# Patient Record
Sex: MALE | Race: WHITE | Employment: OTHER | URBAN - METROPOLITAN AREA
[De-identification: names, ages, dates, MRNs, and addresses within clinical notes are randomized per-mention and may not be internally consistent; named-entity substitution may affect disease eponyms.]

---

## 2020-02-18 ENCOUNTER — HOSPITAL ENCOUNTER (OUTPATIENT)
Dept: LAB | Age: 70
Discharge: HOME | End: 2020-02-18

## 2020-02-18 ENCOUNTER — OFFICE VISIT (OUTPATIENT)
Dept: ONCOLOGY | Age: 70
End: 2020-02-18

## 2020-02-18 VITALS
SYSTOLIC BLOOD PRESSURE: 117 MMHG | TEMPERATURE: 97.4 F | WEIGHT: 231 LBS | OXYGEN SATURATION: 96 % | BODY MASS INDEX: 37.12 KG/M2 | HEIGHT: 66 IN | DIASTOLIC BLOOD PRESSURE: 81 MMHG | RESPIRATION RATE: 20 BRPM | HEART RATE: 85 BPM

## 2020-02-18 DIAGNOSIS — L98.9 SKIN LESION OF BACK: ICD-10-CM

## 2020-02-18 DIAGNOSIS — L98.6 ATYPICAL LYMPHOCYTIC INFILTRATE OF SKIN: Primary | ICD-10-CM

## 2020-02-18 DIAGNOSIS — C85.80 MARGINAL ZONE LYMPHOMA (HCC): ICD-10-CM

## 2020-02-18 DIAGNOSIS — L98.6 ATYPICAL LYMPHOCYTIC INFILTRATE OF SKIN: ICD-10-CM

## 2020-02-18 LAB
ALBUMIN SERPL-MCNC: 4.1 G/DL (ref 3.5–5)
ALBUMIN/GLOB SERPL: 1.3 {RATIO} (ref 1.1–2.2)
ALP SERPL-CCNC: 98 U/L (ref 45–117)
ALT SERPL-CCNC: 43 U/L (ref 12–78)
ANION GAP SERPL CALC-SCNC: 6 MMOL/L (ref 5–15)
AST SERPL-CCNC: 24 U/L (ref 15–37)
BASOPHILS # BLD: 0 K/UL (ref 0–0.1)
BASOPHILS NFR BLD: 1 % (ref 0–1)
BILIRUB SERPL-MCNC: 0.5 MG/DL (ref 0.2–1)
BUN SERPL-MCNC: 15 MG/DL (ref 6–20)
BUN/CREAT SERPL: 17 (ref 12–20)
CALCIUM SERPL-MCNC: 9.2 MG/DL (ref 8.5–10.1)
CHLORIDE SERPL-SCNC: 107 MMOL/L (ref 97–108)
CO2 SERPL-SCNC: 27 MMOL/L (ref 21–32)
CREAT SERPL-MCNC: 0.89 MG/DL (ref 0.7–1.3)
DIFFERENTIAL METHOD BLD: NORMAL
EOSINOPHIL # BLD: 0.3 K/UL (ref 0–0.4)
EOSINOPHIL NFR BLD: 5 % (ref 0–7)
ERYTHROCYTE [DISTWIDTH] IN BLOOD BY AUTOMATED COUNT: 13.5 % (ref 11.5–14.5)
GLOBULIN SER CALC-MCNC: 3.1 G/DL (ref 2–4)
GLUCOSE SERPL-MCNC: 93 MG/DL (ref 65–100)
HCT VFR BLD AUTO: 47.7 % (ref 36.6–50.3)
HGB BLD-MCNC: 15.4 G/DL (ref 12.1–17)
IMM GRANULOCYTES # BLD AUTO: 0 K/UL (ref 0–0.04)
IMM GRANULOCYTES NFR BLD AUTO: 0 % (ref 0–0.5)
LDH SERPL L TO P-CCNC: 179 U/L (ref 85–241)
LYMPHOCYTES # BLD: 1.5 K/UL (ref 0.8–3.5)
LYMPHOCYTES NFR BLD: 28 % (ref 12–49)
MCH RBC QN AUTO: 30.3 PG (ref 26–34)
MCHC RBC AUTO-ENTMCNC: 32.3 G/DL (ref 30–36.5)
MCV RBC AUTO: 93.9 FL (ref 80–99)
MONOCYTES # BLD: 0.7 K/UL (ref 0–1)
MONOCYTES NFR BLD: 13 % (ref 5–13)
NEUTS SEG # BLD: 2.8 K/UL (ref 1.8–8)
NEUTS SEG NFR BLD: 53 % (ref 32–75)
NRBC # BLD: 0 K/UL (ref 0–0.01)
NRBC BLD-RTO: 0 PER 100 WBC
PLATELET # BLD AUTO: 199 K/UL (ref 150–400)
PMV BLD AUTO: 10.1 FL (ref 8.9–12.9)
POTASSIUM SERPL-SCNC: 4.2 MMOL/L (ref 3.5–5.1)
PROT SERPL-MCNC: 7.2 G/DL (ref 6.4–8.2)
RBC # BLD AUTO: 5.08 M/UL (ref 4.1–5.7)
SODIUM SERPL-SCNC: 140 MMOL/L (ref 136–145)
WBC # BLD AUTO: 5.4 K/UL (ref 4.1–11.1)

## 2020-02-18 RX ORDER — CETIRIZINE HCL 10 MG
TABLET ORAL
COMMUNITY

## 2020-02-18 RX ORDER — ASPIRIN 81 MG/1
TABLET ORAL DAILY
COMMUNITY

## 2020-02-18 RX ORDER — NIACIN 500 MG/1
500 TABLET, EXTENDED RELEASE ORAL DAILY
COMMUNITY
Start: 2020-02-01 | End: 2022-06-10

## 2020-02-18 RX ORDER — ATORVASTATIN CALCIUM 20 MG/1
20 TABLET, FILM COATED ORAL DAILY
COMMUNITY
Start: 2020-01-22

## 2020-02-18 RX ORDER — ACETAMINOPHEN 500 MG
TABLET ORAL
COMMUNITY

## 2020-02-18 RX ORDER — BETAMETHASONE DIPROPIONATE 0.5 MG/G
LOTION TOPICAL AS NEEDED
COMMUNITY

## 2020-02-18 NOTE — PROGRESS NOTES
[address] [facility], [place] [facility] at [address] [place] [address]  [phone]    Hematology / Oncology Consult    Reason for Visit:   Austen Estes is a 81 y.o. male who is seen in consultation at the request of DAVID Tinsley and Dr. Ashley Villavicencio ([facility]) for evaluation of possible skin lymphoma. Hematology Oncology Treatment History:     Diagnosis: Possible marginal zone lymphoma    Stage: Pending    Pathology:   1/16/20 R lateral mid-back, 5mm shave biopsy: Atypical lymphoid infiltrate  Comment: The histologic features are quite worrisome for a low-grade B cell neoplasm, possibly a marginal zone lymphoma given the presence of quite a few plasma cells wtihin the infiltrate. However, in situ hybridization for kappa and lambda fialed to produce a monoclonal result and PCR for T- and B-cell rearrangements failed to produce a pcr product and clonality could not be demonstrated. The tissue in the block has been completely exhausted and is no longer available for further studies. It would be important that complete removal or additional biopsies of this process be performed such that repeat studies can be attempted in the hopes of making a more definitive diagnosis. Prior Treatment: None    Current Treatment: pending  Treatment duration   Frequency of visits     History of Present Illness:   Austen Estes is a 81 y.o. male with h/o skin cancer comes in for evaluation of atypical lymphoid infiltrate seen on skin biopsy. He had a Mohs surgery approx 1 yr ago for melanoma. He has been following closely with Dermatology. He recently underwent shave biopsies of 2 lesions on his R back. Pathology showed a atypical lymphoid infiltrate in one of biopsies. The other biopsy was negative. He states that one of these regions has been present for years, but bothering him more recently with pain and irritation.  He had a previous biopsy years ago per patient and based on that history, Dermatology had been treating those lesions as a possible keloid. His chronic medical problems include prostate cancer s/p prostatectomy in 2005, now with yearly PSA checks with PCP. He also has hypercholesterolemia for which he takes statin. No fevers, chills, sweats, unintentional weight loss, n/v/d. He notes dry skin which cracks at times, but no recurrent or current rashes. Past Medical History:   Diagnosis Date    Chronic pain     Hypercholesterolemia       Past Surgical History:   Procedure Laterality Date    HX CHOLECYSTECTOMY      HX HEENT      HX PROSTATE SURGERY      HX VASECTOMY        Social History     Tobacco Use    Smoking status: Never Smoker    Smokeless tobacco: Never Used   Substance Use Topics    Alcohol use: Yes     Alcohol/week: 2.0 standard drinks     Types: 2 Glasses of wine per week      Family History   Problem Relation Age of Onset    Hypertension Mother     Stroke Mother     Cancer Father         multiple myeloma     Current Outpatient Medications   Medication Sig    atorvastatin (LIPITOR) 20 mg tablet Take 20 mg by mouth daily.  niacin ER (NIASPAN) 500 mg tablet Take 500 mg by mouth daily.  betamethasone dipropionate (DIPROLENE) 0.05 % topical lotion Apply  to affected area as needed for Skin Irritation.  folic acid/multivit-min/lutein (CENTRUM SILVER PO) Take  by mouth daily.  aspirin delayed-release 81 mg tablet Take  by mouth daily.  cetirizine (ZYRTEC) 10 mg tablet Take  by mouth daily as needed for Allergies.  acetaminophen (TYLENOL EXTRA STRENGTH) 500 mg tablet Take  by mouth every six (6) hours as needed for Pain.  fluticasone furoate (FLONASE SENSIMIST NA) 2 Sprays by Nasal route daily as needed. No current facility-administered medications for this visit.        Allergies   Allergen Reactions    Ibuprofen Swelling     Swelling and rash to hands & fingers          Review of Systems: A complete review of systems was obtained, negative except as described above.    Physical Exam:     Visit Vitals  /81   Pulse 85   Temp 97.4 °F (36.3 °C) (Oral)   Resp 20   Ht 5' 6\" (1.676 m)   Wt 231 lb (104.8 kg)   SpO2 96%   BMI 37.28 kg/m²     ECOG PS: 0  General: Well developed, no acute distress  Eyes: PERRLA, EOMI, anicteric sclerae  HENT: Atraumatic, OP clear, TMs intact without erythema  Neck: Supple  Lymphatic: No cervical, supraclavicular, axillary or inguinal adenopathy  Respiratory: CTAB, normal respiratory effort  CV: Normal rate, regular rhythm, no murmurs, no peripheral edema  GI: Soft, nontender, nondistended, no masses, no hepatomegaly, no splenomegaly  MS: Normal gait and station. Digits without clubbing or cyanosis. Skin: No ecchymoses, or petechiae. Normal temperature, turgor, and texture. R upper back with erythematous raised lesions - streaks along R mid back and more discrete purplish red raised papular lesions, 1 is 2.5cm horizontally; two subcentimeter similar lesions more laterally. Neuro/Psych: Alert, oriented. 5/5 strength in all 4 extremities. Appropriate affect, normal judgment/insight. Results:   No results found for: WBC, HGB, HCT, PLT, MCV, ANEU, HGBPOC, HCTPOC, HGBEXT, HCTEXT, PLTEXT  No results found for: NA, K, CL, CO2, GLU, BUN, CREA, GFRAA, GFRNA, CA, NAPOC, KPOCT, CLPOC, GLUCPOC, IBUN, CREAPOC, ICAI  No results found for: TBILI, ALT, SGOT, AP, TP, ALB, GLOB  No results found for: IRON, FE, TIBC, IBCT, PSAT, FERR    No results found for: B12LT, FOL, RBCF  No results found for: TSH, TSH2, TSH3, TSHP, TSHEXT  No results found for: HAMAT, HAAB, HABT, HAAT, HBSAG, HBSB, HBSAT, HBABN, HBCM, HBCAB, HBCAT, XBCABS, HBEAB, HBEAG, XHEPCS, [id number], [address] [facility], [facility], [facility], [facility], [address], [id number], [id number], [address] [place] [address], [id number], [facility], [id number], HCGAT      Imaging:     Radiology report(s) reviewed. .    Assessment & Plan:   Freida Faust is a 81 y.o. male with atypical lymphoid infiltrate, possible lymphoma, in skin lesion.     1. Atypical lymphoid infiltrate of skin / Possible marginal zone lymphoma: Pathology testing did not demonstrate monoclonality or B or T-cell gene rearrangements. However, these lesions do appear suspicious and I recommend taking a larger biopsy to enable further and repeat testing.  -- CBC, CMP, LDH, gammopathy panel  -- CT of chest/abd/pelvis  -- BM is not routinely performed  -- Repeat biopsy with a larger, deeper sample is recommended of the same 2.5cm vertical raised lesion. I also recommend taking a biopsy of 1 of 2 similar raised lesions immediately lateral to this larger lesion. [facility] who may discuss with a surgeon for the repeat biopsy. -- Return in 3 weeks for f/u    2. Hyperlipidemia: Managed by PCP and on statin. 3. H/o prostate cancer: s/p prostatectomy in 2005. Emotional well being: Pt is coping well with his/her disease and has excellent support. I appreciate the opportunity to participate in Mr. Jayshree phillips.     Signed By: Phylliss Spurling, MD     February 18, 2020

## 2020-02-20 ENCOUNTER — TELEPHONE (OUTPATIENT)
Dept: ONCOLOGY | Age: 70
End: 2020-02-20

## 2020-02-20 DIAGNOSIS — C44.599 OTHER SPECIFIED MALIGNANT NEOPLASM OF SKIN OF OTHER PART OF TRUNK: ICD-10-CM

## 2020-02-20 DIAGNOSIS — L98.6 ATYPICAL LYMPHOCYTIC INFILTRATE OF SKIN: Primary | ICD-10-CM

## 2020-02-20 DIAGNOSIS — L98.9 SKIN LESION OF BACK: ICD-10-CM

## 2020-02-20 NOTE — TELEPHONE ENCOUNTER
Phuong from scheduling   They need a new CT Scan order of the Abd and pelvis with Contrast    Other order said with and without and they send the scheduling of this to a different hospital

## 2020-02-21 LAB
ALBUMIN SERPL ELPH-MCNC: 4 G/DL (ref 2.9–4.4)
ALBUMIN/GLOB SERPL: 1.4 {RATIO} (ref 0.7–1.7)
ALPHA1 GLOB SERPL ELPH-MCNC: 0.2 G/DL (ref 0–0.4)
ALPHA2 GLOB SERPL ELPH-MCNC: 0.8 G/DL (ref 0.4–1)
B-GLOBULIN SERPL ELPH-MCNC: 0.9 G/DL (ref 0.7–1.3)
GAMMA GLOB SERPL ELPH-MCNC: 1 G/DL (ref 0.4–1.8)
GLOBULIN SER-MCNC: 2.9 G/DL (ref 2.2–3.9)
IGA SERPL-MCNC: 114 MG/DL (ref 61–437)
IGG SERPL-MCNC: 1049 MG/DL (ref 700–1600)
IGM SERPL-MCNC: 81 MG/DL (ref 20–172)
INTERPRETATION SERPL IEP-IMP: NORMAL
KAPPA LC FREE SER-MCNC: 14.9 MG/L (ref 3.3–19.4)
KAPPA LC FREE/LAMBDA FREE SER: 1.13 {RATIO} (ref 0.26–1.65)
LAMBDA LC FREE SERPL-MCNC: 13.2 MG/L (ref 5.7–26.3)
M PROTEIN SERPL ELPH-MCNC: NORMAL G/DL
PROT SERPL-MCNC: 6.9 G/DL (ref 6–8.5)

## 2020-02-27 ENCOUNTER — HOSPITAL ENCOUNTER (OUTPATIENT)
Dept: CT IMAGING | Age: 70
Discharge: HOME | End: 2020-02-27
Attending: INTERNAL MEDICINE
Payer: MEDICARE

## 2020-02-27 DIAGNOSIS — L98.9 SKIN LESION OF BACK: ICD-10-CM

## 2020-02-27 DIAGNOSIS — C44.599 OTHER SPECIFIED MALIGNANT NEOPLASM OF SKIN OF OTHER PART OF TRUNK: ICD-10-CM

## 2020-02-27 DIAGNOSIS — L98.6 ATYPICAL LYMPHOCYTIC INFILTRATE OF SKIN: ICD-10-CM

## 2020-02-27 PROCEDURE — 74011636320 HC RX REV CODE- 636/320: Performed by: INTERNAL MEDICINE

## 2020-02-27 PROCEDURE — 74177 CT ABD & PELVIS W/CONTRAST: CPT

## 2020-02-27 RX ADMIN — IOPAMIDOL 100 ML: 755 INJECTION, SOLUTION INTRAVENOUS at 14:53

## 2020-03-11 NOTE — PROGRESS NOTES
[id number] [facility] at [facility]  [phone]    Hematology / Oncology Established Visit    Reason for Visit:   Austen Estes is a 81 y.o. male who comes in for f/u of cutaneous lymphoma. Initially referred by DAVID Tinsley and Dr. Ashley Villavicencio ([address] Hematology Oncology Treatment History:     Diagnosis: Primary cutaneous marginal zone lymphoma    Stage: Pending    Pathology:   1/16/20 R lateral mid-back, 5mm shave biopsy: Atypical lymphoid infiltrate  Comment: The histologic features are quite worrisome for a low-grade B cell neoplasm, possibly a marginal zone lymphoma given the presence of quite a few plasma cells wtihin the infiltrate. However, in situ hybridization for kappa and lambda fialed to produce a monoclonal result and PCR for T- and B-cell rearrangements failed to produce a pcr product and clonality could not be demonstrated. The tissue in the block has been completely exhausted and is no longer available for further studies. It would be important that complete removal or additional biopsies of this process be performed such that repeat studies can be attempted in the hopes of making a more definitive diagnosis. 2/20/20 R mid-back lateral lesion and excision right medial mid-back skin lesion: Marginal zone lymphoma  Immunophenotype: BCL2+, CD43+; Tumor size up to 17mm; Grade 1  Flow cytometry: Monoclonal B cell population (18% of cells) without detectable CD5, CD10, CD23 expression c/w B cell lymphoma/leukemia. Differential diagnosis baesd on immunophenotype includes: Marginal zone lymphoma, lymphoplasmacytic lymphoma, [id number] follicular lymphoma, large B cell lymphoma. Prior Treatment: None    Current Treatment: Pt to get evaluated by [facility].    Treatment duration   Frequency of visits     History of Present Illness:   Austen Estes is a 81 y.o. male with h/o skin cancer comes in for evaluation of atypical lymphoid infiltrate seen on skin biopsy. He had a Mohs surgery approx 1 yr ago for melanoma. He has been following closely with Dermatology. He recently underwent shave biopsies of 2 lesions on his R back. Pathology showed a atypical lymphoid infiltrate in one of biopsies. The other biopsy was negative. He states that one of these regions has been present for years, but bothering him more recently with pain and irritation. He had a previous biopsy years ago per patient and based on that history, Dermatology had been treating those lesions as a possible keloid. His chronic medical problems include prostate cancer s/p prostatectomy in 2005, now with yearly PSA checks with PCP. He also has hypercholesterolemia for which he takes statin. No fevers, chills, sweats, unintentional weight loss, n/v/d. He notes dry skin which cracks at times, but no recurrent or current rashes. Patient comes in for follow up after recent surgical resection of the raised lesions on right lateral back by Dr. Lavern Mcpherson. Past Medical History:   Diagnosis Date    Chronic pain     Hypercholesterolemia       Past Surgical History:   Procedure Laterality Date    HX CHOLECYSTECTOMY      HX HEENT      HX PROSTATE SURGERY      HX VASECTOMY        Social History     Tobacco Use    Smoking status: Never Smoker    Smokeless tobacco: Never Used   Substance Use Topics    Alcohol use: Yes     Alcohol/week: 2.0 standard drinks     Types: 2 Glasses of wine per week      Family History   Problem Relation Age of Onset    Hypertension Mother     Stroke Mother     Cancer Father         multiple myeloma     Current Outpatient Medications   Medication Sig    atorvastatin (LIPITOR) 20 mg tablet Take 20 mg by mouth daily.  niacin ER (NIASPAN) 500 mg tablet Take 500 mg by mouth daily.  betamethasone dipropionate (DIPROLENE) 0.05 % topical lotion Apply  to affected area as needed for Skin Irritation.     folic acid/multivit-min/lutein (CENTRUM SILVER PO) Take  by mouth daily.    aspirin delayed-release 81 mg tablet Take  by mouth daily.  cetirizine (ZYRTEC) 10 mg tablet Take  by mouth daily as needed for Allergies.  acetaminophen (TYLENOL EXTRA STRENGTH) 500 mg tablet Take  by mouth every six (6) hours as needed for Pain.  fluticasone furoate (FLONASE SENSIMIST NA) 2 Sprays by Nasal route daily as needed. No current facility-administered medications for this visit. Allergies   Allergen Reactions    Ibuprofen Swelling     Swelling and rash to hands & fingers          Review of Systems: A complete review of systems was obtained, negative except as described above. Physical Exam:     There were no vitals taken for this visit. ECOG PS: 0  General: Well developed, no acute distress  Eyes: PERRLA, EOMI, anicteric sclerae  HENT: Atraumatic, OP clear, TMs intact without erythema  Neck: Supple  Lymphatic: No cervical, supraclavicular, axillary or inguinal adenopathy  Respiratory: CTAB, normal respiratory effort  CV: Normal rate, regular rhythm, no murmurs, no peripheral edema  GI: Soft, nontender, nondistended, no masses, no hepatomegaly, no splenomegaly  MS: Normal gait and station. Digits without clubbing or cyanosis. Skin: No ecchymoses, or petechiae. Normal temperature, turgor, and texture. R upper back with erythematous raised lesions - streaks along R mid back and more discrete purplish red raised papular lesions, 1 is 2.5cm horizontally; two subcentimeter similar lesions more laterally. Neuro/Psych: Alert, oriented. 5/5 strength in all 4 extremities. Appropriate affect, normal judgment/insight. Results:     Lab Results   Component Value Date/Time    WBC 5.4 02/18/2020 03:23 PM    HGB 15.4 02/18/2020 03:23 PM    HCT 47.7 02/18/2020 03:23 PM    PLATELET 979 66/62/1762 03:23 PM    MCV 93.9 02/18/2020 03:23 PM    ABS.  NEUTROPHILS 2.8 02/18/2020 03:23 PM     Lab Results   Component Value Date/Time    Sodium 140 02/18/2020 03:23 PM    Potassium 4.2 2020 03:23 PM    Chloride 107 2020 03:23 PM    CO2 27 2020 03:23 PM    Glucose 93 2020 03:23 PM    BUN 15 2020 03:23 PM    Creatinine 0.89 2020 03:23 PM    GFR est AA >60 2020 03:23 PM    GFR est non-AA >60 2020 03:23 PM    Calcium 9.2 2020 03:23 PM     Lab Results   Component Value Date/Time    Bilirubin, total 0.5 2020 03:23 PM    ALT (SGPT) 43 2020 03:23 PM    AST (SGOT) 24 2020 03:23 PM    Alk. phosphatase 98 2020 03:23 PM    Protein, total 7.2 2020 03:23 PM    Protein, total 6.9 2020 03:23 PM    Albumin 4.1 2020 03:23 PM    Globulin 3.1 2020 03:23 PM     No results found for: IRON, FE, TIBC, IBCT, PSAT, FERR    No results found for: B12LT, FOL, RBCF  No results found for: TSH, TSH2, TSH3, TSHP, TSHEXT, TSHEXT  No results found for: HAMAT, HAAB, HABT, HAAT, HBSAG, HBSB, HBSAT, HBABN, HBCM, HBCAB, HBCAT, XBCABS, [facility], [address] [place] [address], [address] [place] [address], 606[address]706 [facility], [address] [place] [address], [address] [place] [address], [id number], [id number], [address] [place] [address], [id number], HBCMLT, [id number], HCGAT      Imagin/27/20 CT of ch/abd/p:  IMPRESSION:  No evidence of lymphadenopathy in the chest, abdomen, or pelvis. Assessment & Plan:   Yolis Bello is a 81 y.o. male with atypical lymphoid infiltrate, possible lymphoma, in skin lesion. 1. Primary cutaneous marginal zone lymphoma: No B symptoms, lymphadenopathy, cytopenias. SPEP and free light chains negative for monoclonal protein. BM is not routinely performed for lymphoma limited to the skin. If these skin lesions can be contained within one radiation field, the recommended treatment is local radiation therapy rather than observation, surgery or chemoimmunotherapy. A radiation dose of 24 Gy is typically used. For those with asymptomatic, but multifocal disease, it is recommended to follow observation.  If any of the lesions become symptomatic, treatment is directed at the symptomatic lesions with intralesional triamcinolone, low-dose radiation therapy, or surgical excision rather than chemotherapy - given indolent nature. -- Refer to [facility] for radiation to skin lesions. -- Return in 3 months for f/u    2. Hyperlipidemia: Managed by PCP and on statin. 3. H/o prostate cancer: s/p prostatectomy in 2005. Emotional well being: Pt is coping well with his/her disease and has excellent support. I appreciate the opportunity to participate in Mr. Skyler Pablo care.     Signed By: Seun Astorga MD     March 12, 2020

## 2020-03-12 ENCOUNTER — OFFICE VISIT (OUTPATIENT)
Dept: ONCOLOGY | Age: 70
End: 2020-03-12

## 2020-03-12 VITALS
OXYGEN SATURATION: 98 % | WEIGHT: 230 LBS | SYSTOLIC BLOOD PRESSURE: 126 MMHG | DIASTOLIC BLOOD PRESSURE: 78 MMHG | HEIGHT: 66 IN | RESPIRATION RATE: 16 BRPM | BODY MASS INDEX: 36.96 KG/M2 | HEART RATE: 76 BPM

## 2020-03-12 DIAGNOSIS — C88.4 PRIMARY CUTANEOUS MARGINAL ZONE B-CELL LYMPHOMA (HCC): Primary | ICD-10-CM

## 2020-03-12 DIAGNOSIS — Z85.46 PERSONAL HISTORY OF PROSTATE CANCER: ICD-10-CM

## 2020-03-12 DIAGNOSIS — L98.6 ATYPICAL LYMPHOCYTIC INFILTRATE OF SKIN: ICD-10-CM

## 2020-03-12 NOTE — PROGRESS NOTES
Roxanna Bella is a 81 y.o. male here today for follow up of lymphoma. Complains of bilateral hip pain and right knee pain, 2/10.

## 2020-03-13 ENCOUNTER — TELEPHONE (OUTPATIENT)
Dept: ONCOLOGY | Age: 70
End: 2020-03-13

## 2020-03-13 NOTE — TELEPHONE ENCOUNTER
Call placed to patient. Verified ID x 2. Advised of scheduled appointment with Dr. Abner Lin on 3/19/20, to arrive at 10:15 am.  Patient states he may have scheduling conflict that day but will check calendar. Provided contact number to Dr. Blake Alex office should he need to reschedule.

## 2020-04-08 ENCOUNTER — HOSPITAL ENCOUNTER (OUTPATIENT)
Dept: RADIATION THERAPY | Age: 70
Discharge: HOME | End: 2020-04-08

## 2020-06-02 NOTE — PROGRESS NOTES
[address] [facility]  [phone]    Hematology / Oncology Established Visit    Reason for Visit:   Felipa Mcgrath is a 81 y.o. male who is seen by synchronous (real-time) audio-video technology on 6/4/2020 for follow up of  cutaneous lymphoma. Initially referred by DAVID Wolfe and Dr. Demetri Garcia ([address] [facility])    Hematology Oncology Treatment History:     Diagnosis: Primary cutaneous marginal zone lymphoma    Stage: Pending    Pathology:   1/16/20 R lateral mid-back, 5mm shave biopsy: Atypical lymphoid infiltrate  Comment: The histologic features are quite worrisome for a low-grade B cell neoplasm, possibly a marginal zone lymphoma given the presence of quite a few plasma cells wtihin the infiltrate. However, in situ hybridization for kappa and lambda fialed to produce a monoclonal result and PCR for T- and B-cell rearrangements failed to produce a pcr product and clonality could not be demonstrated. The tissue in the block has been completely exhausted and is no longer available for further studies. It would be important that complete removal or additional biopsies of this process be performed such that repeat studies can be attempted in the hopes of making a more definitive diagnosis. 2/20/20 R mid-back lateral lesion and excision right medial mid-back skin lesion: Marginal zone lymphoma  Immunophenotype: BCL2+, CD43+; Tumor size up to 17mm; Grade 1  Flow cytometry: Monoclonal B cell population (18% of cells) without detectable CD5, CD10, CD23 expression c/w B cell lymphoma/leukemia. Differential diagnosis baesd on immunophenotype includes: Marginal zone lymphoma, lymphoplasmacytic lymphoma, [id number] follicular lymphoma, large B cell lymphoma.     Prior Treatment: Radiation 200cGy x 12 fractions, 4/30/20 - 5/15/20    Current Treatment: Surveillance    History of Present Illness:   Felipa Mcgrath is a 81 y.o. male with h/o skin cancer is seen for evaluation of cutaneous marginal zone lymphoma. He had a Mohs surgery in 2019 for melanoma. He has been following closely with Dermatology. He recently underwent shave biopsies of 2 lesions on his R back. Pathology showed a atypical lymphoid infiltrate in one of biopsies. The other biopsy was negative. He states that one of these regions has been present for years, but bothering him more recently with pain and irritation. He had a previous biopsy years ago per patient and based on that history, Dermatology had been treating those lesions as a possible keloid. His chronic medical problems include prostate cancer s/p prostatectomy in 2005, now with yearly PSA checks with PCP. He also has hypercholesterolemia for which he takes statin. No fevers, chills, sweats, unintentional weight loss, n/v/d. He notes dry skin which cracks at times, but no recurrent or current rashes. Patient underwent surgical resection of the raised lesions on right lateral back by Dr. Nichole Ramos. Interval History:  Pt is seen for follow up of cutaneous marginal zone lymphoma. He recently completed radiation to the skin lesions on his back on 5/15/20. Aside from mild erythema, no significant rash. No new skin lesions. No medication changes. No recent infections or illnesses. No fevers, chills, sweats, lumps/bumps, unintentional weight loss. Past Medical History:   Diagnosis Date    Chronic pain     Hypercholesterolemia       Past Surgical History:   Procedure Laterality Date    HX CHOLECYSTECTOMY      HX HEENT      HX PROSTATE SURGERY      HX VASECTOMY        Social History     Tobacco Use    Smoking status: Never Smoker    Smokeless tobacco: Never Used   Substance Use Topics    Alcohol use:  Yes     Alcohol/week: 2.0 standard drinks     Types: 2 Glasses of wine per week      Family History   Problem Relation Age of Onset    Hypertension Mother     Stroke Mother     Cancer Father         multiple myeloma     Current Outpatient Medications   Medication Sig    atorvastatin (LIPITOR) 20 mg tablet Take 20 mg by mouth daily.  niacin ER (NIASPAN) 500 mg tablet Take 500 mg by mouth daily.  betamethasone dipropionate (DIPROLENE) 0.05 % topical lotion Apply  to affected area as needed for Skin Irritation.  folic acid/multivit-min/lutein (CENTRUM SILVER PO) Take  by mouth daily.  aspirin delayed-release 81 mg tablet Take  by mouth daily.  cetirizine (ZYRTEC) 10 mg tablet Take  by mouth daily as needed for Allergies.  acetaminophen (TYLENOL EXTRA STRENGTH) 500 mg tablet Take  by mouth every six (6) hours as needed for Pain.  fluticasone furoate (FLONASE SENSIMIST NA) 2 Sprays by Nasal route daily as needed. No current facility-administered medications for this visit. Allergies   Allergen Reactions    Ibuprofen Swelling     Swelling and rash to hands & fingers          Review of Systems: A complete review of systems was obtained, negative except as described above. Physical Exam:     Due to this being a TeleHealth evaluation, many elements of the physical examination are unable to be assessed. Constitutional: Appears well-developed and well-nourished in no apparent distress   Mental status: Alert and awake, Oriented to person/place/time, Able to follow commands  Eyes: EOM normal, Sclera normal, No visible ocular discharge  HENT: Normocephalic, atraumatic; Mouth/Throat: Moist mucous membranes, External Ears normal  Neck: No visualized mass  Pulmonary/Chest: Respiratory effort normal, No visualized signs of difficulty breathing or respiratory distress   Musculoskeletal: Normal gait with no signs of ataxia, Normal range of motion of neck  Neurological: No facial asymmetry (Cranial nerve 7 motor function), No gaze palsy  Skin: No significant exanthematous lesions or discoloration noted on facial skin  Psychiatric: Normal affect, normal judgment/insight.  No hallucinations     Results:     Lab Results Component Value Date/Time    WBC 5.4 2020 03:23 PM    HGB 15.4 2020 03:23 PM    HCT 47.7 2020 03:23 PM    PLATELET 234  03:23 PM    MCV 93.9 2020 03:23 PM    ABS. NEUTROPHILS 2.8 2020 03:23 PM     Lab Results   Component Value Date/Time    Sodium 140 2020 03:23 PM    Potassium 4.2 2020 03:23 PM    Chloride 107 2020 03:23 PM    CO2 27 2020 03:23 PM    Glucose 93 2020 03:23 PM    BUN 15 2020 03:23 PM    Creatinine 0.89 2020 03:23 PM    GFR est AA >60 2020 03:23 PM    GFR est non-AA >60 2020 03:23 PM    Calcium 9.2 2020 03:23 PM     Lab Results   Component Value Date/Time    Bilirubin, total 0.5 2020 03:23 PM    ALT (SGPT) 43 2020 03:23 PM    Alk. phosphatase 98 2020 03:23 PM    Protein, total 7.2 2020 03:23 PM    Protein, total 6.9 2020 03:23 PM    Albumin 4.1 2020 03:23 PM    Globulin 3.1 2020 03:23 PM     No results found for: IRON, FE, TIBC, IBCT, PSAT, FERR    No results found for: B12LT, FOL, RBCF  No results found for: TSH, TSH2, TSH3, TSHP, TSHEXT, TSHEXT  No results found for: HAMAT, HAAB, HABT, HAAT, HBSAG, HBSB, HBSAT, HBABN, HBCM, HBCAB, HBCAT, XBCABS, [address], [address], [address] [facility], [address] [facility], [address] [facility], [address] [place] [address], [address] [place] [address], [id number], [id number], [address] [place] [address], [id number], HBCMLT, [id number], HCGAT      Imagin/27/20 CT of ch/abd/p:  IMPRESSION:  No evidence of lymphadenopathy in the chest, abdomen, or pelvis. Assessment & Plan:   Pamela Fry is a 81 y.o. male with atypical lymphoid infiltrate, possible lymphoma, in skin lesion. 1. Primary cutaneous marginal zone lymphoma: No B symptoms, lymphadenopathy, cytopenias. SPEP and free light chains negative for monoclonal protein. BM is not routinely performed for lymphoma limited to the skin.  If these skin lesions can be contained within one radiation field, the recommended treatment is local radiation therapy rather than observation, surgery or chemoimmunotherapy. A radiation dose of 24 Gy is typically used. For those with asymptomatic, but multifocal disease, it is recommended to follow observation. If any of the lesions become symptomatic, treatment is directed at the symptomatic lesions with intralesional triamcinolone, low-dose radiation therapy, or surgical excision rather than chemotherapy - given indolent nature. Now s/p radiation to skin lesions on his upper back 5/15/20. -- Return in 6 months for f/u  -- Sees Dermatology every 6 months, next visit scheduled 7/16/20.    2. Hyperlipidemia: Managed by PCP and on statin. 3. H/o prostate cancer: s/p prostatectomy in 2005. Emotional well being: Pt is coping well with his/her disease and has excellent support. I appreciate the opportunity to participate in Mr. Jackson Seek care. Total physician time spent on this encounter was 40 minutes. I was in the office while conducting this encounter. The patient was at his at home    Consent:  He and/or his healthcare decision maker is aware that this patient-initiated Telehealth encounter is a billable service, with coverage as determined by his insurance carrier. He is aware that he may receive a bill and has provided verbal consent to proceed: Yes    Pursuant to the emergency declaration under the [address] and the [facility], [address] waiver authority and the [employer] Act, this Virtual  Visit was conducted, with patient's (and/or legal guardian's) consent, to reduce the patient's risk of exposure to COVID-19 and provide necessary medical care. Services were provided through a video synchronous discussion virtually to substitute for in-person visit.       Signed By: Ramila Mott MD     June 4, 2020

## 2020-06-04 ENCOUNTER — VIRTUAL VISIT (OUTPATIENT)
Dept: ONCOLOGY | Age: 70
End: 2020-06-04

## 2020-06-04 DIAGNOSIS — L98.6 ATYPICAL LYMPHOCYTIC INFILTRATE OF SKIN: ICD-10-CM

## 2020-06-04 DIAGNOSIS — Z85.46 PERSONAL HISTORY OF PROSTATE CANCER: ICD-10-CM

## 2020-06-04 DIAGNOSIS — L98.9 SKIN LESION OF BACK: ICD-10-CM

## 2020-06-04 DIAGNOSIS — C88.4 PRIMARY CUTANEOUS MARGINAL ZONE B-CELL LYMPHOMA (HCC): Primary | ICD-10-CM

## 2020-06-04 NOTE — PROGRESS NOTES
Autumn Solis is a 81 y.o. male here for follow up of lymphoma. Patient with no complaints of pain at this time.

## 2020-08-07 ENCOUNTER — HOSPITAL ENCOUNTER (OUTPATIENT)
Dept: RADIATION THERAPY | Age: 70
Discharge: HOME | End: 2020-08-07

## 2020-11-30 ENCOUNTER — HOSPITAL ENCOUNTER (OUTPATIENT)
Dept: LAB | Age: 70
Discharge: HOME | End: 2020-11-30
Payer: MEDICARE

## 2020-11-30 PROCEDURE — 83615 LACTATE (LD) (LDH) ENZYME: CPT

## 2020-11-30 PROCEDURE — 80053 COMPREHEN METABOLIC PANEL: CPT

## 2020-11-30 PROCEDURE — 85025 COMPLETE CBC W/AUTO DIFF WBC: CPT

## 2020-11-30 PROCEDURE — 36415 COLL VENOUS BLD VENIPUNCTURE: CPT

## 2020-12-01 DIAGNOSIS — C88.4 PRIMARY CUTANEOUS MARGINAL ZONE B-CELL LYMPHOMA (HCC): ICD-10-CM

## 2020-12-01 LAB
ALBUMIN SERPL-MCNC: 4.6 G/DL (ref 3.8–4.8)
ALBUMIN/GLOB SERPL: 2.1 {RATIO} (ref 1.2–2.2)
ALP SERPL-CCNC: 101 IU/L (ref 39–117)
ALT SERPL-CCNC: 25 IU/L (ref 0–44)
AST SERPL-CCNC: 22 IU/L (ref 0–40)
BASOPHILS # BLD AUTO: 0 X10E3/UL (ref 0–0.2)
BASOPHILS NFR BLD AUTO: 1 %
BILIRUB SERPL-MCNC: 0.7 MG/DL (ref 0–1.2)
BUN SERPL-MCNC: 14 MG/DL (ref 8–27)
BUN/CREAT SERPL: 15 (ref 10–24)
CALCIUM SERPL-MCNC: 9.5 MG/DL (ref 8.6–10.2)
CHLORIDE SERPL-SCNC: 105 MMOL/L (ref 96–106)
CO2 SERPL-SCNC: 24 MMOL/L (ref 20–29)
CREAT SERPL-MCNC: 0.94 MG/DL (ref 0.76–1.27)
EOSINOPHIL # BLD AUTO: 0.3 X10E3/UL (ref 0–0.4)
EOSINOPHIL NFR BLD AUTO: 6 %
ERYTHROCYTE [DISTWIDTH] IN BLOOD BY AUTOMATED COUNT: 12.6 % (ref 11.6–15.4)
GLOBULIN SER CALC-MCNC: 2.2 G/DL (ref 1.5–4.5)
GLUCOSE SERPL-MCNC: 104 MG/DL (ref 65–99)
HCT VFR BLD AUTO: 47.2 % (ref 37.5–51)
HGB BLD-MCNC: 15.9 G/DL (ref 13–17.7)
IMM GRANULOCYTES # BLD AUTO: 0 X10E3/UL (ref 0–0.1)
IMM GRANULOCYTES NFR BLD AUTO: 0 %
LDH SERPL-CCNC: 159 IU/L (ref 121–224)
LYMPHOCYTES # BLD AUTO: 1.6 X10E3/UL (ref 0.7–3.1)
LYMPHOCYTES NFR BLD AUTO: 32 %
MCH RBC QN AUTO: 30.6 PG (ref 26.6–33)
MCHC RBC AUTO-ENTMCNC: 33.7 G/DL (ref 31.5–35.7)
MCV RBC AUTO: 91 FL (ref 79–97)
MONOCYTES # BLD AUTO: 0.7 X10E3/UL (ref 0.1–0.9)
MONOCYTES NFR BLD AUTO: 13 %
NEUTROPHILS # BLD AUTO: 2.5 X10E3/UL (ref 1.4–7)
NEUTROPHILS NFR BLD AUTO: 48 %
PLATELET # BLD AUTO: 208 X10E3/UL (ref 150–450)
POTASSIUM SERPL-SCNC: 4.3 MMOL/L (ref 3.5–5.2)
PROT SERPL-MCNC: 6.8 G/DL (ref 6–8.5)
RBC # BLD AUTO: 5.19 X10E6/UL (ref 4.14–5.8)
SODIUM SERPL-SCNC: 142 MMOL/L (ref 134–144)
WBC # BLD AUTO: 5.2 X10E3/UL (ref 3.4–10.8)

## 2020-12-02 NOTE — PROGRESS NOTES
[address] [facility]  [phone]    Hematology / Oncology Established Visit    Reason for Visit:   Mitchell Kumar is a 78 y.o. male who is seen for follow up of  cutaneous lymphoma. Initially referred by DAVID Bird and Dr. Anita Castaneda ([address] [facility])    Hematology Oncology Treatment History:     Diagnosis: Primary cutaneous marginal zone lymphoma    Stage: N/A    Pathology:   1/16/20 R lateral mid-back, 5mm shave biopsy: Atypical lymphoid infiltrate  Comment: The histologic features are quite worrisome for a low-grade B cell neoplasm, possibly a marginal zone lymphoma given the presence of quite a few plasma cells wtihin the infiltrate. However, in situ hybridization for kappa and lambda fialed to produce a monoclonal result and PCR for T- and B-cell rearrangements failed to produce a pcr product and clonality could not be demonstrated. The tissue in the block has been completely exhausted and is no longer available for further studies. It would be important that complete removal or additional biopsies of this process be performed such that repeat studies can be attempted in the hopes of making a more definitive diagnosis. 2/20/20 R mid-back lateral lesion and excision right medial mid-back skin lesion: Marginal zone lymphoma  Immunophenotype: BCL2+, CD43+; Tumor size up to 17mm; Grade 1  Flow cytometry: Monoclonal B cell population (18% of cells) without detectable CD5, CD10, CD23 expression c/w B cell lymphoma/leukemia. Differential diagnosis baesd on immunophenotype includes: Marginal zone lymphoma, lymphoplasmacytic lymphoma, [id number] follicular lymphoma, large B cell lymphoma. Prior Treatment: Radiation 200cGy x 12 fractions, 4/30/20 - 5/15/20    Current Treatment: Surveillance    History of Present Illness:   Mitchell Kumar is a 78 y.o. male with h/o skin cancer is seen for evaluation of cutaneous marginal zone lymphoma.  He had a Mohs surgery in 2019 for melanoma. He has been following closely with Dermatology. He recently underwent shave biopsies of 2 lesions on his R back. Pathology showed a atypical lymphoid infiltrate in one of biopsies. The other biopsy was negative. He states that one of these regions has been present for years, but bothering him more recently with pain and irritation. He had a previous biopsy years ago per patient and based on that history, Dermatology had been treating those lesions as a possible keloid. His chronic medical problems include prostate cancer s/p prostatectomy in 2005, now with yearly PSA checks with PCP. He also has hypercholesterolemia for which he takes statin. No fevers, chills, sweats, unintentional weight loss, n/v/d. He notes dry skin which cracks at times, but no recurrent or current rashes. Patient underwent surgical resection of the raised lesions on right lateral back by Dr. Minda Dominguez. Interval History:  Pt is seen for follow up of cutaneous marginal zone lymphoma. He completed radiation to the skin lesions on his back on 5/15/20. Intermittently he notices tingling right lower back near radiation site. Denies erythema or rashes. No new skin lesions. No medication changes. No recent infections or illnesses. No fevers, chills, sweats, lumps/bumps, unintentional weight loss. Past Medical History:   Diagnosis Date    Chronic pain     Hypercholesterolemia       Past Surgical History:   Procedure Laterality Date    HX CHOLECYSTECTOMY      HX HEENT      HX PROSTATE SURGERY      HX VASECTOMY        Social History     Tobacco Use    Smoking status: Never Smoker    Smokeless tobacco: Never Used   Substance Use Topics    Alcohol use:  Yes     Alcohol/week: 2.0 standard drinks     Types: 2 Glasses of wine per week      Family History   Problem Relation Age of Onset    Hypertension Mother     Stroke Mother     Cancer Father         multiple myeloma     Current Outpatient Medications Medication Sig    atorvastatin (LIPITOR) 20 mg tablet Take 20 mg by mouth daily.  niacin ER (NIASPAN) 500 mg tablet Take 500 mg by mouth daily.  betamethasone dipropionate (DIPROLENE) 0.05 % topical lotion Apply  to affected area as needed for Skin Irritation.  folic acid/multivit-min/lutein (CENTRUM SILVER PO) Take  by mouth daily.  aspirin delayed-release 81 mg tablet Take  by mouth daily.  cetirizine (ZYRTEC) 10 mg tablet Take  by mouth daily as needed for Allergies.  acetaminophen (TYLENOL EXTRA STRENGTH) 500 mg tablet Take  by mouth every six (6) hours as needed for Pain.  fluticasone furoate (FLONASE SENSIMIST NA) 2 Sprays by Nasal route daily as needed. No current facility-administered medications for this visit. Allergies   Allergen Reactions    Ibuprofen Swelling     Swelling and rash to hands & fingers          Review of Systems: A complete review of systems was obtained, negative except as described above. Physical Exam:       Visit Vitals  /81   Pulse 88   Temp (!) 96.6 °F (35.9 °C)   Resp 16   Ht 5' 6\" (1.676 m)   Wt 228 lb (103.4 kg)   SpO2 96%   BMI 36.80 kg/m²       ECOG PS: 0  General: Well developed, no acute distress  Eyes: PERRLA, EOMI, anicteric sclerae  HENT: Atraumatic, OP clear, TMs intact without erythema  Neck: Supple  Lymphatic: No cervical, supraclavicular, axillary or inguinal adenopathy  Respiratory: CTAB, normal respiratory effort  CV: Normal rate, regular rhythm, no murmurs, no peripheral edema  GI: Soft, nontender, nondistended, no masses, no hepatomegaly, no splenomegaly  MS: Normal gait and station. Digits without clubbing or cyanosis. Skin: No rashes, ecchymoses, or petechiae. Normal temperature, turgor, and texture. Midline 4cm vertical surgical scar and approx 10cm right lateral torso surgical scar. Neuro/Psych: Alert, oriented. 5/5 strength in all 4 extremities. Appropriate affect, normal judgment/insight.       Results:     Lab Results   Component Value Date/Time    WBC 5.2 2020 08:46 AM    HGB 15.9 2020 08:46 AM    HCT 47.2 2020 08:46 AM    PLATELET 531  08:46 AM    MCV 91 2020 08:46 AM    ABS. NEUTROPHILS 2.5 2020 08:46 AM     Lab Results   Component Value Date/Time    Sodium 142 2020 08:46 AM    Potassium 4.3 2020 08:46 AM    Chloride 105 2020 08:46 AM    CO2 24 2020 08:46 AM    Glucose 104 (H) 2020 08:46 AM    BUN 14 2020 08:46 AM    Creatinine 0.94 2020 08:46 AM    GFR est AA 96 2020 08:46 AM    GFR est non-AA 83 2020 08:46 AM    Calcium 9.5 2020 08:46 AM     Lab Results   Component Value Date/Time    Bilirubin, total 0.7 2020 08:46 AM    ALT (SGPT) 25 2020 08:46 AM    Alk. phosphatase 101 2020 08:46 AM    Protein, total 6.8 2020 08:46 AM    Albumin 4.6 2020 08:46 AM    Globulin 3.1 2020 03:23 PM     No results found for: IRON, FE, TIBC, IBCT, PSAT, FERR    No results found for: B12LT, FOL, RBCF  No results found for: TSH, TSH2, TSH3, TSHP, TSHEXT, TSHEXT  No results found for: HAMAT, HAAB, HABT, HAAT, HBSAG, HBSB, HBSAT, HBABN, HBCM, HBCAB, HBCAT, XBCABS, [address] [facility], [address], XHEPCS, [address] [facility], [address] [facility], [facility], [facility], [address], [id number], [id number], [address] [place] [address], [id number], HBCMLT, [id number], HCGAT      Imagin/27/20 CT of ch/abd/p:  IMPRESSION:  No evidence of lymphadenopathy in the chest, abdomen, or pelvis. Assessment & Plan:   Dolores Cortés is a 78 y.o. male with atypical lymphoid infiltrate, possible lymphoma, in skin lesion. 1. Primary cutaneous marginal zone lymphoma: No B symptoms, lymphadenopathy, cytopenias. SPEP and free light chains negative for monoclonal protein. BM is not routinely performed for lymphoma limited to the skin.  If these skin lesions can be contained within one radiation field, the recommended treatment is local radiation therapy rather than observation, surgery or chemoimmunotherapy. A radiation dose of 24 Gy is typically used. For those with asymptomatic, but multifocal disease, it is recommended to follow observation. If any of the lesions become symptomatic, treatment is directed at the symptomatic lesions with intralesional triamcinolone, low-dose radiation therapy, or surgical excision rather than chemotherapy - given indolent nature. Now s/p radiation to skin lesions on his upper back 5/15/20. -- Return in 6 months for f/u  -- Sees Dermatology every 6 months, next visit scheduled 3/2021.    2. Hyperlipidemia: Managed by PCP and on statin. 3. H/o prostate cancer: s/p prostatectomy in 2005. Emotional well being: Pt is coping well with his/her disease and has excellent support. I appreciate the opportunity to participate in Mr. Driss phillips.       Signed By: Osman Jaquez MD     December 4, 2020

## 2020-12-04 ENCOUNTER — OFFICE VISIT (OUTPATIENT)
Dept: ONCOLOGY | Age: 70
End: 2020-12-04
Payer: MEDICARE

## 2020-12-04 VITALS
RESPIRATION RATE: 16 BRPM | HEIGHT: 66 IN | DIASTOLIC BLOOD PRESSURE: 81 MMHG | HEART RATE: 88 BPM | SYSTOLIC BLOOD PRESSURE: 135 MMHG | BODY MASS INDEX: 36.64 KG/M2 | WEIGHT: 228 LBS | TEMPERATURE: 96.6 F | OXYGEN SATURATION: 96 %

## 2020-12-04 DIAGNOSIS — Z85.46 PERSONAL HISTORY OF PROSTATE CANCER: ICD-10-CM

## 2020-12-04 DIAGNOSIS — C88.4 PRIMARY CUTANEOUS MARGINAL ZONE B-CELL LYMPHOMA (HCC): Primary | ICD-10-CM

## 2020-12-04 DIAGNOSIS — E78.2 MIXED HYPERLIPIDEMIA: ICD-10-CM

## 2020-12-04 PROCEDURE — G8427 DOCREV CUR MEDS BY ELIG CLIN: HCPCS | Performed by: INTERNAL MEDICINE

## 2020-12-04 PROCEDURE — 1101F PT FALLS ASSESS-DOCD LE1/YR: CPT | Performed by: INTERNAL MEDICINE

## 2020-12-04 PROCEDURE — G0463 HOSPITAL OUTPT CLINIC VISIT: HCPCS | Performed by: INTERNAL MEDICINE

## 2020-12-04 PROCEDURE — 3017F COLORECTAL CA SCREEN DOC REV: CPT | Performed by: INTERNAL MEDICINE

## 2020-12-04 PROCEDURE — 99214 OFFICE O/P EST MOD 30 MIN: CPT | Performed by: INTERNAL MEDICINE

## 2020-12-04 PROCEDURE — G8536 NO DOC ELDER MAL SCRN: HCPCS | Performed by: INTERNAL MEDICINE

## 2020-12-04 PROCEDURE — G8417 CALC BMI ABV UP PARAM F/U: HCPCS | Performed by: INTERNAL MEDICINE

## 2020-12-04 PROCEDURE — G8510 SCR DEP NEG, NO PLAN REQD: HCPCS | Performed by: INTERNAL MEDICINE

## 2020-12-04 NOTE — PROGRESS NOTES
Chief Complaint   Patient presents with   Juani Sorto is a pleasant 78year old male who presents today as a follow up for lymphoma. He denies any pain at this time.

## 2021-06-01 ENCOUNTER — HOSPITAL ENCOUNTER (OUTPATIENT)
Dept: LAB | Age: 71
Discharge: HOME | End: 2021-06-01
Payer: MEDICARE

## 2021-06-01 DIAGNOSIS — C88.4 PRIMARY CUTANEOUS MARGINAL ZONE B-CELL LYMPHOMA (HCC): ICD-10-CM

## 2021-06-01 PROCEDURE — 85025 COMPLETE CBC W/AUTO DIFF WBC: CPT

## 2021-06-01 PROCEDURE — 36415 COLL VENOUS BLD VENIPUNCTURE: CPT

## 2021-06-01 PROCEDURE — 83615 LACTATE (LD) (LDH) ENZYME: CPT

## 2021-06-01 PROCEDURE — 80053 COMPREHEN METABOLIC PANEL: CPT

## 2021-06-02 LAB
ALBUMIN SERPL-MCNC: 4.4 G/DL (ref 3.8–4.8)
ALBUMIN/GLOB SERPL: 1.6 {RATIO} (ref 1.2–2.2)
ALP SERPL-CCNC: 84 IU/L (ref 48–121)
ALT SERPL-CCNC: 33 IU/L (ref 0–44)
AST SERPL-CCNC: 29 IU/L (ref 0–40)
BASOPHILS # BLD AUTO: 0 X10E3/UL (ref 0–0.2)
BASOPHILS NFR BLD AUTO: 1 %
BILIRUB SERPL-MCNC: 1 MG/DL (ref 0–1.2)
BUN SERPL-MCNC: 13 MG/DL (ref 8–27)
BUN/CREAT SERPL: 13 (ref 10–24)
CALCIUM SERPL-MCNC: 9.5 MG/DL (ref 8.6–10.2)
CHLORIDE SERPL-SCNC: 104 MMOL/L (ref 96–106)
CO2 SERPL-SCNC: 26 MMOL/L (ref 20–29)
CREAT SERPL-MCNC: 1 MG/DL (ref 0.76–1.27)
EOSINOPHIL # BLD AUTO: 0.3 X10E3/UL (ref 0–0.4)
EOSINOPHIL NFR BLD AUTO: 6 %
ERYTHROCYTE [DISTWIDTH] IN BLOOD BY AUTOMATED COUNT: 12.9 % (ref 11.6–15.4)
GLOBULIN SER CALC-MCNC: 2.7 G/DL (ref 1.5–4.5)
GLUCOSE SERPL-MCNC: 104 MG/DL (ref 65–99)
HCT VFR BLD AUTO: 48.7 % (ref 37.5–51)
HGB BLD-MCNC: 16.1 G/DL (ref 13–17.7)
IMM GRANULOCYTES # BLD AUTO: 0 X10E3/UL (ref 0–0.1)
IMM GRANULOCYTES NFR BLD AUTO: 0 %
LDH SERPL-CCNC: 189 IU/L (ref 121–224)
LYMPHOCYTES # BLD AUTO: 1.3 X10E3/UL (ref 0.7–3.1)
LYMPHOCYTES NFR BLD AUTO: 28 %
MCH RBC QN AUTO: 30.2 PG (ref 26.6–33)
MCHC RBC AUTO-ENTMCNC: 33.1 G/DL (ref 31.5–35.7)
MCV RBC AUTO: 91 FL (ref 79–97)
MONOCYTES # BLD AUTO: 0.5 X10E3/UL (ref 0.1–0.9)
MONOCYTES NFR BLD AUTO: 12 %
NEUTROPHILS # BLD AUTO: 2.4 X10E3/UL (ref 1.4–7)
NEUTROPHILS NFR BLD AUTO: 53 %
PLATELET # BLD AUTO: 191 X10E3/UL (ref 150–450)
POTASSIUM SERPL-SCNC: 5.1 MMOL/L (ref 3.5–5.2)
PROT SERPL-MCNC: 7.1 G/DL (ref 6–8.5)
RBC # BLD AUTO: 5.33 X10E6/UL (ref 4.14–5.8)
SODIUM SERPL-SCNC: 143 MMOL/L (ref 134–144)
WBC # BLD AUTO: 4.6 X10E3/UL (ref 3.4–10.8)

## 2021-06-11 ENCOUNTER — OFFICE VISIT (OUTPATIENT)
Dept: ONCOLOGY | Age: 71
End: 2021-06-11
Payer: MEDICARE

## 2021-06-11 VITALS
HEIGHT: 66 IN | RESPIRATION RATE: 16 BRPM | TEMPERATURE: 97.2 F | HEART RATE: 89 BPM | SYSTOLIC BLOOD PRESSURE: 135 MMHG | OXYGEN SATURATION: 94 % | DIASTOLIC BLOOD PRESSURE: 78 MMHG | WEIGHT: 226.4 LBS | BODY MASS INDEX: 36.38 KG/M2

## 2021-06-11 DIAGNOSIS — C88.4 PRIMARY CUTANEOUS MARGINAL ZONE B-CELL LYMPHOMA (HCC): Primary | ICD-10-CM

## 2021-06-11 PROCEDURE — 1101F PT FALLS ASSESS-DOCD LE1/YR: CPT | Performed by: INTERNAL MEDICINE

## 2021-06-11 PROCEDURE — 99214 OFFICE O/P EST MOD 30 MIN: CPT | Performed by: INTERNAL MEDICINE

## 2021-06-11 PROCEDURE — G0463 HOSPITAL OUTPT CLINIC VISIT: HCPCS | Performed by: INTERNAL MEDICINE

## 2021-06-11 PROCEDURE — G8417 CALC BMI ABV UP PARAM F/U: HCPCS | Performed by: INTERNAL MEDICINE

## 2021-06-11 PROCEDURE — G8510 SCR DEP NEG, NO PLAN REQD: HCPCS | Performed by: INTERNAL MEDICINE

## 2021-06-11 PROCEDURE — G8536 NO DOC ELDER MAL SCRN: HCPCS | Performed by: INTERNAL MEDICINE

## 2021-06-11 PROCEDURE — G8427 DOCREV CUR MEDS BY ELIG CLIN: HCPCS | Performed by: INTERNAL MEDICINE

## 2021-06-11 PROCEDURE — 3017F COLORECTAL CA SCREEN DOC REV: CPT | Performed by: INTERNAL MEDICINE

## 2021-06-11 NOTE — PROGRESS NOTES
[address] [facility] Oncology at [facility]  [phone]    Hematology / Oncology Established Visit    Reason for Visit:   Marleni Pope is a 78 y.o. male who is seen for follow up of  cutaneous lymphoma. Initially referred by PA Lorain Brunner and Dr. Marley Ragsdale ([facility])    Hematology Oncology Treatment History:     Diagnosis: Primary cutaneous marginal zone lymphoma    Stage: N/A    Pathology:   1/16/20 R lateral mid-back, 5mm shave biopsy: Atypical lymphoid infiltrate  Comment: The histologic features are quite worrisome for a low-grade B cell neoplasm, possibly a marginal zone lymphoma given the presence of quite a few plasma cells wtihin the infiltrate. However, in situ hybridization for kappa and lambda fialed to produce a monoclonal result and PCR for T- and B-cell rearrangements failed to produce a pcr product and clonality could not be demonstrated. The tissue in the block has been completely exhausted and is no longer available for further studies. It would be important that complete removal or additional biopsies of this process be performed such that repeat studies can be attempted in the hopes of making a more definitive diagnosis. 2/20/20 R mid-back lateral lesion and excision right medial mid-back skin lesion: Marginal zone lymphoma  Immunophenotype: BCL2+, CD43+; Tumor size up to 17mm; Grade 1  Flow cytometry: Monoclonal B cell population (18% of cells) without detectable CD5, CD10, CD23 expression c/w B cell lymphoma/leukemia. Differential diagnosis baesd on immunophenotype includes: Marginal zone lymphoma, lymphoplasmacytic lymphoma, [id number] follicular lymphoma, large B cell lymphoma. Prior Treatment: Radiation 200cGy x 12 fractions, 4/30/20 - 5/15/20    Current Treatment: Surveillance    History of Present Illness:   Marleni Pope is a 78 y.o. male with h/o skin cancer is seen for evaluation of cutaneous marginal zone lymphoma.  He had a Mohs surgery in 2019 for melanoma. He has been following closely with Dermatology. He recently underwent shave biopsies of 2 lesions on his R back. Pathology showed a atypical lymphoid infiltrate in one of biopsies. The other biopsy was negative. He states that one of these regions has been present for years, but bothering him more recently with pain and irritation. He had a previous biopsy years ago per patient and based on that history, Dermatology had been treating those lesions as a possible keloid. His chronic medical problems include prostate cancer s/p prostatectomy in 2005, now with yearly PSA checks with PCP. He also has hypercholesterolemia for which he takes statin. No fevers, chills, sweats, unintentional weight loss, n/v/d. He notes dry skin which cracks at times, but no recurrent or current rashes. Patient underwent surgical resection of the raised lesions on right lateral back by Dr. Tu Calderon. Interval History:  Pt is seen for follow up of cutaneous marginal zone lymphoma. No fevers, chills, sweats, lumps/bumps, unintentional weight loss. He last saw Dermatology in 3/2021 for scheduled skin check. He had a few spots noted on his face which were \"frozen off. \"      Family History   Problem Relation Age of Onset    Hypertension Mother     Stroke Mother     Cancer Father         multiple myeloma      Review of Systems: A complete review of systems was obtained, negative except as described above.     Physical Exam:       Visit Vitals  /78   Pulse 89   Temp 97.2 °F (36.2 °C)   Resp 16   Ht 5' 6\" (1.676 m)   Wt 226 lb 6.4 oz (102.7 kg)   SpO2 94%   BMI 36.54 kg/m²       ECOG PS: 0  General: Well developed, no acute distress  Eyes: PERRLA, EOMI, anicteric sclerae  HENT: Atraumatic, OP clear, TMs intact without erythema  Neck: Supple  Lymphatic: No cervical, supraclavicular, axillary or inguinal adenopathy  Respiratory: CTAB, normal respiratory effort  CV: Normal rate, regular rhythm, no murmurs, no peripheral edema  GI: Soft, nontender, nondistended, no masses, no hepatomegaly, no splenomegaly  MS: Normal gait and station. Digits without clubbing or cyanosis. Skin: No rashes, ecchymoses, or petechiae. Normal temperature, turgor, and texture. Midline 4cm vertical surgical scar and approx 10cm right lateral torso surgical scar. Prior hyperpigmented region resolved. Neuro/Psych: Alert, oriented. 5/5 strength in all 4 extremities. Appropriate affect, normal judgment/insight. Results:     Lab Results   Component Value Date/Time    WBC 4.6 2021 08:13 AM    HGB 16.1 2021 08:13 AM    HCT 48.7 2021 08:13 AM    PLATELET 031  08:13 AM    MCV 91 2021 08:13 AM    ABS. NEUTROPHILS 2.4 2021 08:13 AM     Lab Results   Component Value Date/Time    Sodium 143 2021 08:13 AM    Potassium 5.1 2021 08:13 AM    Chloride 104 2021 08:13 AM    CO2 26 2021 08:13 AM    Glucose 104 (H) 2021 08:13 AM    BUN 13 2021 08:13 AM    Creatinine 1.00 2021 08:13 AM    GFR est AA 89 2021 08:13 AM    GFR est non-AA 77 2021 08:13 AM    Calcium 9.5 2021 08:13 AM     Lab Results   Component Value Date/Time    Bilirubin, total 1.0 2021 08:13 AM    ALT (SGPT) 33 2021 08:13 AM    Alk.  phosphatase 84 2021 08:13 AM    Protein, total 7.1 2021 08:13 AM    Albumin 4.4 2021 08:13 AM    Globulin 3.1 2020 03:23 PM     No results found for: IRON, FE, TIBC, IBCT, PSAT, FERR    No results found for: B12LT, FOL, RBCF  No results found for: TSH, TSH2, TSH3, TSHP, TSHEXT, TSHEXT  No results found for: HAMAT, HAAB, HABT, HAAT, HBSAG, HBSB, HBSAT, HBABN, HBCM, HBCAB, HBCAT, XBCABS, [facility], [address], [facility], [id number], [facility], [facility], HBCLT, [facility], [id number], [id number], [address] [place] [address], [id number], HBCMLT, [id number], HCGAT      Imagin/27/20 CT of ch/abd/p:  IMPRESSION:  No evidence of lymphadenopathy in the chest, abdomen, or pelvis. Assessment & Plan:   Carolyn Whitlock is a 78 y.o. male with atypical lymphoid infiltrate, possible lymphoma, in skin lesion. 1. Primary cutaneous marginal zone lymphoma: No B symptoms, lymphadenopathy, cytopenias. SPEP and free light chains negative for monoclonal protein. BM is not routinely performed for lymphoma limited to the skin. If these skin lesions can be contained within one radiation field, the recommended treatment is local radiation therapy rather than observation, surgery or chemoimmunotherapy. A radiation dose of 24 Gy is typically used. For those with asymptomatic, but multifocal disease, it is recommended to follow observation. If any of the lesions become symptomatic, treatment is directed at the symptomatic lesions with intralesional triamcinolone, low-dose radiation therapy, or surgical excision rather than chemotherapy - given indolent nature. Now s/p radiation to skin lesions on his upper back 5/15/20.  -- Labs in 6 mo and 12 mo  -- Return in 12 months for f/u  -- Sees Dermatology every 6 months  -- Had COVID vaccine. 2. Hyperlipidemia: Managed by PCP and on statin. 3. H/o prostate cancer: s/p prostatectomy in 2005. Emotional well being: Pt is coping well with his/her disease and has excellent support. I appreciate the opportunity to participate in Mr. Araceli phillips.       Signed By: Tam Calzada MD     June 11, 2021

## 2021-06-11 NOTE — PROGRESS NOTES
Chief Complaint   Patient presents with   Dheeraj Chery is a pleasant 78year old male who presents as a follow up for lymphoma.  He denies pain

## 2022-05-16 NOTE — PROGRESS NOTES
[address] [facility] Oncology at [address] [place] [address]  [phone]    Hematology / Oncology Established Visit    Reason for Visit:   Kelley Fu is a 73 y.o. male who is seen for follow up of  cutaneous lymphoma. Initially referred by DAVID Blevins and Dr. Codie Gamble ([address] [place] [address])    Hematology Oncology Treatment History:     Diagnosis: Primary cutaneous marginal zone lymphoma    Stage: N/A    Pathology:   1/16/20 R lateral mid-back, 5mm shave biopsy: Atypical lymphoid infiltrate  Comment: The histologic features are quite worrisome for a low-grade B cell neoplasm, possibly a marginal zone lymphoma given the presence of quite a few plasma cells wtihin the infiltrate. However, in situ hybridization for kappa and lambda fialed to produce a monoclonal result and PCR for T- and B-cell rearrangements failed to produce a pcr product and clonality could not be demonstrated. The tissue in the block has been completely exhausted and is no longer available for further studies. It would be important that complete removal or additional biopsies of this process be performed such that repeat studies can be attempted in the hopes of making a more definitive diagnosis. 2/20/20 R mid-back lateral lesion and excision right medial mid-back skin lesion: Marginal zone lymphoma  Immunophenotype: BCL2+, CD43+; Tumor size up to 17mm; Grade 1  Flow cytometry: Monoclonal B cell population (18% of cells) without detectable CD5, CD10, CD23 expression c/w B cell lymphoma/leukemia. Differential diagnosis baesd on immunophenotype includes: Marginal zone lymphoma, lymphoplasmacytic lymphoma, [id number] follicular lymphoma, large B cell lymphoma. Prior Treatment: Radiation 200cGy x 12 fractions, 4/30/20 - 5/15/20    Current Treatment: Surveillance    History of Present Illness:   Kelley Fu is a 73 y.o. male with h/o skin cancer is seen for evaluation of cutaneous marginal zone lymphoma.  He had a Mohs surgery in 2019 for melanoma. He has been following closely with Dermatology. He recently underwent shave biopsies of 2 lesions on his R back. Pathology showed a atypical lymphoid infiltrate in one of biopsies. The other biopsy was negative. He states that one of these regions has been present for years, but bothering him more recently with pain and irritation. He had a previous biopsy years ago per patient and based on that history, Dermatology had been treating those lesions as a possible keloid. His chronic medical problems include prostate cancer s/p prostatectomy in 2005, now with yearly PSA checks with PCP. He also has hypercholesterolemia for which he takes statin. No fevers, chills, sweats, unintentional weight loss, n/v/d. He notes dry skin which cracks at times, but no recurrent or current rashes. Patient underwent surgical resection of the raised lesions on right lateral back by Dr. Jose Fish. Interval History:  Pt is seen for follow up of cutaneous marginal zone lymphoma. He was evaluated by Pennie Monterroso (dermatology) on 5/12/22. He had several AK lesions treated with liquid nitrogen. Shave biopsy or punch biopsy was suggested to R lateral mid back erythematous plaque, but pt wanted to wait until this visit to lesion could be visualized. No pain or pruritus. FHx: Father had multiple myeloma  Review of Systems: A complete review of systems was obtained, negative except as described above.     Physical Exam:     Visit Vitals  /84 (BP 1 Location: Right upper arm, BP Patient Position: Sitting, BP Cuff Size: Large adult)   Pulse 77   Temp 97.7 °F (36.5 °C) (Oral)   Resp 14   Ht 5' 6\" (1.676 m)   Wt 227 lb 3.2 oz (103.1 kg)   SpO2 96%   BMI 36.67 kg/m²       ECOG PS: 0  General: no distress  Eyes: anicteric sclerae  HENT: oropharynx clear  Neck: supple  Lymphatic: no cervical, supraclavicular adenopathy  Respiratory: normal respiratory effort  CV: no peripheral edema  GI: soft, nontender, nondistended, no masses  Skin: no rashes; no ecchymoses; no petechiae; Midline 4cm vertical surgical scar and approx 10cm right lateral torso surgical scar. Superiolateral to this scar is a faint erythematous lesion with surrounding spotty erythema extending inferio-laterally (see picture.)      Results:     Lab Results   Component Value Date/Time    WBC 6.2 2022 01:56 PM    HGB 16.1 2022 01:56 PM    HCT 49.9 2022 01:56 PM    PLATELET 196 15/67/2947 01:56 PM    MCV 95 2022 01:56 PM    ABS. NEUTROPHILS 3.6 2022 01:56 PM     Lab Results   Component Value Date/Time    Sodium 143 2022 01:56 PM    Potassium 4.8 2022 01:56 PM    Chloride 103 2022 01:56 PM    CO2 26 2022 01:56 PM    Glucose 99 2022 01:56 PM    BUN 16 2022 01:56 PM    Creatinine 1.00 2022 01:56 PM    GFR est AA 89 2021 08:13 AM    GFR est non-AA 77 2021 08:13 AM    Calcium 9.3 2022 01:56 PM     Lab Results   Component Value Date/Time    Bilirubin, total 0.5 2022 01:56 PM    ALT (SGPT) 34 2022 01:56 PM    Alk. phosphatase 98 2022 01:56 PM    Protein, total 6.8 2022 01:56 PM    Albumin 4.4 2022 01:56 PM    Globulin 3.1 2020 03:23 PM     No results found for: IRON, FE, TIBC, IBCT, PSAT, FERR    No results found for: B12LT, FOL, RBCF  No results found for: TSH, TSH2, TSH3, TSHP, TSHEXT, TSHEXT  No results found for: HAMAT, HAAB, HABT, HAAT, HBSAG, HBSB, HBSAT, HBABN, HBCM, HBCAB, HBCAT, XBCABS, [address] [facility], [address] [place] [address], [address] [place] [address], 606[address] Blake Goodson, [address], [facility], [address] [place] [address] Drive, [address] [place] [address], [id number], [id number], [address] [place] [address], [id number], [facility], [id number], HCGAT      Imagin/27/20 CT of ch/abd/p:  IMPRESSION:  No evidence of lymphadenopathy in the chest, abdomen, or pelvis. Assessment & Plan:   Cassidy Fam is a 73 y.o. male with atypical lymphoid infiltrate, possible lymphoma, in skin lesion.     1. Primary cutaneous marginal zone lymphoma: No B symptoms, lymphadenopathy, cytopenias. SPEP and free light chains negative for monoclonal protein. BM is not routinely performed for lymphoma limited to the skin. If these skin lesions can be contained within one radiation field, the recommended treatment is local radiation therapy rather than observation, surgery or chemoimmunotherapy. A radiation dose of 24 Gy is typically used. For those with asymptomatic, but multifocal disease, it is recommended to follow observation. If any of the lesions become symptomatic, treatment is directed at the symptomatic lesions with intralesional triamcinolone, low-dose radiation therapy, or surgical excision rather than chemotherapy - given indolent nature. Now s/p radiation to skin lesions on his upper back 5/15/20. Labs currently normal in 6/2022. New rash/lesion in R upper back may represent recurrence. Agree with punch biopsy. -- Labs in 6 mo  -- Return in 6 weeks to review punch biopsy results. Will discuss results and loaded picture with [facility] (Dr. Donaldo Giraldo)  -- Sees Dermatology every 6 months    2. Hyperlipidemia: Managed by PCP and on statin. 3. H/o prostate cancer: s/p prostatectomy in 2005. Emotional well being: Pt is coping well with his/her disease and has excellent support. I appreciate the opportunity to participate in Mr. Manolo phillips.       Signed By: Isaac Enriquez MD     Karina 10, 2022

## 2022-06-09 LAB
ALBUMIN SERPL-MCNC: 4.4 G/DL (ref 3.8–4.8)
ALBUMIN/GLOB SERPL: 1.8 {RATIO} (ref 1.2–2.2)
ALP SERPL-CCNC: 98 IU/L (ref 44–121)
ALT SERPL-CCNC: 34 IU/L (ref 0–44)
AST SERPL-CCNC: 28 IU/L (ref 0–40)
BASOPHILS # BLD AUTO: 0.1 X10E3/UL (ref 0–0.2)
BASOPHILS NFR BLD AUTO: 1 %
BILIRUB SERPL-MCNC: 0.5 MG/DL (ref 0–1.2)
BUN SERPL-MCNC: 16 MG/DL (ref 8–27)
BUN/CREAT SERPL: 16 (ref 10–24)
CALCIUM SERPL-MCNC: 9.3 MG/DL (ref 8.6–10.2)
CHLORIDE SERPL-SCNC: 103 MMOL/L (ref 96–106)
CO2 SERPL-SCNC: 26 MMOL/L (ref 20–29)
CREAT SERPL-MCNC: 1 MG/DL (ref 0.76–1.27)
EGFR: 81 ML/MIN/1.73
EOSINOPHIL # BLD AUTO: 0.3 X10E3/UL (ref 0–0.4)
EOSINOPHIL NFR BLD AUTO: 5 %
ERYTHROCYTE [DISTWIDTH] IN BLOOD BY AUTOMATED COUNT: 13 % (ref 11.6–15.4)
GLOBULIN SER CALC-MCNC: 2.4 G/DL (ref 1.5–4.5)
GLUCOSE SERPL-MCNC: 99 MG/DL (ref 65–99)
HCT VFR BLD AUTO: 49.9 % (ref 37.5–51)
HGB BLD-MCNC: 16.1 G/DL (ref 13–17.7)
IMM GRANULOCYTES # BLD AUTO: 0 X10E3/UL (ref 0–0.1)
IMM GRANULOCYTES NFR BLD AUTO: 0 %
LYMPHOCYTES # BLD AUTO: 1.7 X10E3/UL (ref 0.7–3.1)
LYMPHOCYTES NFR BLD AUTO: 27 %
MCH RBC QN AUTO: 30.6 PG (ref 26.6–33)
MCHC RBC AUTO-ENTMCNC: 32.3 G/DL (ref 31.5–35.7)
MCV RBC AUTO: 95 FL (ref 79–97)
MONOCYTES # BLD AUTO: 0.5 X10E3/UL (ref 0.1–0.9)
MONOCYTES NFR BLD AUTO: 8 %
NEUTROPHILS # BLD AUTO: 3.6 X10E3/UL (ref 1.4–7)
NEUTROPHILS NFR BLD AUTO: 59 %
PLATELET # BLD AUTO: 228 X10E3/UL (ref 150–450)
POTASSIUM SERPL-SCNC: 4.8 MMOL/L (ref 3.5–5.2)
PROT SERPL-MCNC: 6.8 G/DL (ref 6–8.5)
RBC # BLD AUTO: 5.26 X10E6/UL (ref 4.14–5.8)
SODIUM SERPL-SCNC: 143 MMOL/L (ref 134–144)
WBC # BLD AUTO: 6.2 X10E3/UL (ref 3.4–10.8)

## 2022-06-10 ENCOUNTER — OFFICE VISIT (OUTPATIENT)
Dept: ONCOLOGY | Age: 72
End: 2022-06-10
Payer: MEDICARE

## 2022-06-10 VITALS
TEMPERATURE: 97.7 F | HEIGHT: 66 IN | HEART RATE: 77 BPM | OXYGEN SATURATION: 96 % | WEIGHT: 227.2 LBS | DIASTOLIC BLOOD PRESSURE: 84 MMHG | RESPIRATION RATE: 14 BRPM | BODY MASS INDEX: 36.52 KG/M2 | SYSTOLIC BLOOD PRESSURE: 123 MMHG

## 2022-06-10 DIAGNOSIS — Z85.46 PERSONAL HISTORY OF PROSTATE CANCER: ICD-10-CM

## 2022-06-10 DIAGNOSIS — C88.4 PRIMARY CUTANEOUS MARGINAL ZONE B-CELL LYMPHOMA (HCC): Primary | ICD-10-CM

## 2022-06-10 PROCEDURE — G8427 DOCREV CUR MEDS BY ELIG CLIN: HCPCS | Performed by: INTERNAL MEDICINE

## 2022-06-10 PROCEDURE — 99214 OFFICE O/P EST MOD 30 MIN: CPT | Performed by: INTERNAL MEDICINE

## 2022-06-10 PROCEDURE — 1101F PT FALLS ASSESS-DOCD LE1/YR: CPT | Performed by: INTERNAL MEDICINE

## 2022-06-10 PROCEDURE — G0463 HOSPITAL OUTPT CLINIC VISIT: HCPCS | Performed by: INTERNAL MEDICINE

## 2022-06-10 PROCEDURE — 1123F ACP DISCUSS/DSCN MKR DOCD: CPT | Performed by: INTERNAL MEDICINE

## 2022-06-10 PROCEDURE — G8536 NO DOC ELDER MAL SCRN: HCPCS | Performed by: INTERNAL MEDICINE

## 2022-06-10 PROCEDURE — G8510 SCR DEP NEG, NO PLAN REQD: HCPCS | Performed by: INTERNAL MEDICINE

## 2022-06-10 PROCEDURE — G8417 CALC BMI ABV UP PARAM F/U: HCPCS | Performed by: INTERNAL MEDICINE

## 2022-06-10 PROCEDURE — 3017F COLORECTAL CA SCREEN DOC REV: CPT | Performed by: INTERNAL MEDICINE

## 2022-06-10 RX ORDER — CYCLOBENZAPRINE HCL 10 MG
TABLET ORAL AS NEEDED
COMMUNITY
Start: 2022-04-11

## 2022-06-10 NOTE — PROGRESS NOTES
Identified pt with two pt identifiers(name and ). Reviewed record in preparation for visit and have obtained necessary documentation. Chief Complaint   Patient presents with    Follow-up     1yr, lymphoma    Other     pt reports spot on lower R back that dermatologist also saw          Flaco Kymberly:    06/10/22 0937   BP: 123/84   Pulse: 77   Resp: 14   Temp: 97.7 °F (36.5 °C)   TempSrc: Oral   SpO2: 96%   Weight: 227 lb 3.2 oz (103.1 kg)   Height: 5' 6\" (1.676 m)   PainSc:   0 - No pain       Pain Scale: 0 - No pain/10        Coordination of Care Questionnaire:  :     1. Have you been to the ER, urgent care clinic since your last visit? Hospitalized since your last visit? No    2. Have you seen or consulted any other health care providers outside of the [address] [place] [address] since your last visit? Include any pap smears or colon screening.  YES, el[facility]low derm and [facility] dermatology

## 2022-07-14 NOTE — PROGRESS NOTES
[address] [facility] Oncology at [address] [place] [address]  [phone]    Hematology / Oncology Established Visit    Reason for Visit:   Autumn Solis is a 81 y.o. male who is seen for follow up of  cutaneous lymphoma. Initially referred by DAVID Willett and Dr. Chantell Reynolds ([address])    Hematology Oncology Treatment History:     Diagnosis: Primary cutaneous marginal zone lymphoma    Stage: N/A    Pathology:   1/16/20 R lateral mid-back, 5mm shave biopsy: Atypical lymphoid infiltrate  Comment: The histologic features are quite worrisome for a low-grade B cell neoplasm, possibly a marginal zone lymphoma given the presence of quite a few plasma cells wtihin the infiltrate. However, in situ hybridization for kappa and lambda fialed to produce a monoclonal result and PCR for T- and B-cell rearrangements failed to produce a pcr product and clonality could not be demonstrated. The tissue in the block has been completely exhausted and is no longer available for further studies. It would be important that complete removal or additional biopsies of this process be performed such that repeat studies can be attempted in the hopes of making a more definitive diagnosis. 2/20/20 R mid-back lateral lesion and excision right medial mid-back skin lesion: Marginal zone lymphoma  Immunophenotype: BCL2+, CD43+; Tumor size up to 17mm; Grade 1  Flow cytometry: Monoclonal B cell population (18% of cells) without detectable CD5, CD10, CD23 expression c/w B cell lymphoma/leukemia. Differential diagnosis baesd on immunophenotype includes: Marginal zone lymphoma, lymphoplasmacytic lymphoma, [id number] follicular lymphoma, large B cell lymphoma. 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  Kappa in-situ hybridization decorates the plasma cells, while lambda is negative or almost negative. Prior Treatment: Radiation 200cGy x 12 fractions, 4/30/20 - 5/15/20    Current Treatment: Surveillance    History of Present Illness:   Mitzie Canavan is a 81 y.o. male with h/o skin cancer is seen for evaluation of cutaneous marginal zone lymphoma. He had a Mohs surgery in 2019 for melanoma. He has been following closely with Dermatology. He recently underwent shave biopsies of 2 lesions on his R back. Pathology showed a atypical lymphoid infiltrate in one of biopsies. The other biopsy was negative. He states that one of these regions has been present for years, but bothering him more recently with pain and irritation. He had a previous biopsy years ago per patient and based on that history, Dermatology had been treating those lesions as a possible keloid. His chronic medical problems include prostate cancer s/p prostatectomy in 2005, now with yearly PSA checks with PCP. He also has hypercholesterolemia for which he takes statin. No fevers, chills, sweats, unintentional weight loss, n/v/d. He notes dry skin which cracks at times, but no recurrent or current rashes. Patient underwent surgical resection of the raised lesions on right lateral back by Dr. Kerry Mckeon. Interval History:  Pt is seen for follow up of cutaneous marginal zone lymphoma. 7/8/22 he had a punch biopsy to R lateral mid back erythematous plaque. He denies any pruritus or symptoms related to this new rash. FHx: Father had multiple myeloma  Review of Systems: A complete review of systems was obtained, negative except as described above.     Physical Exam:     Visit Vitals  /82   Pulse 89   Temp 98 °F (36.7 °C) (Temporal)   Resp 18   Ht 5' 6\" (1.676 m)   Wt 228 lb (103.4 kg)   SpO2 95%   BMI 36.80 kg/m²       ECOG PS: 0  General: no distress  Eyes: anicteric sclerae  HENT: oropharynx clear  Neck: supple  Lymphatic: no cervical, supraclavicular adenopathy  Respiratory: normal respiratory effort  CV: no peripheral edema  GI: soft, nontender, nondistended, no masses  Skin: no rashes; no ecchymoses; no petechiae; Midline 4cm vertical surgical scar and approx 10cm right lateral torso surgical scar. Superiolateral to this scar is a faint erythematous lesion with surrounding spotty erythema extending inferio-laterally (see picture.)      Results:     Lab Results   Component Value Date/Time    WBC 6.2 2022 01:56 PM    HGB 16.1 2022 01:56 PM    HCT 49.9 2022 01:56 PM    PLATELET 580  01:56 PM    MCV 95 2022 01:56 PM    ABS. NEUTROPHILS 3.6 2022 01:56 PM     Lab Results   Component Value Date/Time    Sodium 143 2022 01:56 PM    Potassium 4.8 2022 01:56 PM    Chloride 103 2022 01:56 PM    CO2 26 2022 01:56 PM    Glucose 99 2022 01:56 PM    BUN 16 2022 01:56 PM    Creatinine 1.00 2022 01:56 PM    GFR est AA 89 2021 08:13 AM    GFR est non-AA 77 2021 08:13 AM    Calcium 9.3 2022 01:56 PM     Lab Results   Component Value Date/Time    Bilirubin, total 0.5 2022 01:56 PM    ALT (SGPT) 34 2022 01:56 PM    Alk. phosphatase 98 2022 01:56 PM    Protein, total 6.8 2022 01:56 PM    Albumin 4.4 2022 01:56 PM    Globulin 3.1 2020 03:23 PM     No results found for: IRON, FE, TIBC, IBCT, PSAT, FERR    No results found for: B12LT, FOL, RBCF  No results found for: TSH, TSH2, TSH3, TSHP, TSHEXT, TSHEXT  No results found for: HAMAT, HAAB, HABT, HAAT, HBSAG, HBSB, HBSAT, HBABN, HBCM, HBCAB, HBCAT, XBCABS, [address] [facility], [address], [facility], 606[address]196 Blake Goodson, [address] [facility], [address] [place] [address], [address] [place] [address], [id number], [id number], [address] [place] [address], [id number], HBCMLT, [id number], HCGAT      Imagin/27/20 CT of ch/abd/p:  IMPRESSION:  No evidence of lymphadenopathy in the chest, abdomen, or pelvis.     Assessment & Plan:   Michael Conley is a 81 y.o. male with atypical lymphoid infiltrate, possible lymphoma, in skin lesion. 1. Primary cutaneous marginal zone lymphoma: No B symptoms, lymphadenopathy, cytopenias. SPEP and free light chains negative for monoclonal protein. BM is not routinely performed for lymphoma limited to the skin. If these skin lesions can be contained within one radiation field, the recommended treatment is local radiation therapy rather than observation, surgery or chemoimmunotherapy. A radiation dose of 24 Gy is typically used. For those with asymptomatic, but multifocal disease, it is recommended to follow observation. If any of the lesions become symptomatic, treatment is directed at the symptomatic lesions with intralesional triamcinolone, low-dose radiation therapy, or surgical excision rather than chemotherapy - given indolent nature. s/p radiation to skin lesions on his upper back 5/15/20. Labs currently normal in 6/2022. Reviewed pathology from biopsy of new rash in R upper back which represents recurrence of the same process as prior. Discussed pros/cons of repeat radiation. Discussed with Dr. Ambar Hui in [address] [place] [address] who feels the rash is just outside the prior treatment field. -- Refer back to [facility] for evaluation. -- Labs in 6 mo few days prior to follow up. -- Sees Dermatology every 6 months    2. Hyperlipidemia: Managed by PCP and on statin. 3. H/o prostate cancer: s/p prostatectomy in 2005. Emotional well being: Pt is coping well with his/her disease and has excellent support. I appreciate the opportunity to participate in Mr. Faizan phillips. I personally provided the service.      Signed By: Lupe Glaser MD     July 21, 2022

## 2022-07-21 ENCOUNTER — OFFICE VISIT (OUTPATIENT)
Dept: ONCOLOGY | Age: 72
End: 2022-07-21
Payer: MEDICARE

## 2022-07-21 VITALS
DIASTOLIC BLOOD PRESSURE: 82 MMHG | HEART RATE: 89 BPM | OXYGEN SATURATION: 95 % | HEIGHT: 66 IN | BODY MASS INDEX: 36.64 KG/M2 | RESPIRATION RATE: 18 BRPM | SYSTOLIC BLOOD PRESSURE: 123 MMHG | WEIGHT: 228 LBS | TEMPERATURE: 98 F

## 2022-07-21 DIAGNOSIS — R21 RASH: ICD-10-CM

## 2022-07-21 DIAGNOSIS — C88.4 PRIMARY CUTANEOUS MARGINAL ZONE B-CELL LYMPHOMA (HCC): Primary | ICD-10-CM

## 2022-07-21 PROCEDURE — 1123F ACP DISCUSS/DSCN MKR DOCD: CPT | Performed by: INTERNAL MEDICINE

## 2022-07-21 PROCEDURE — 99214 OFFICE O/P EST MOD 30 MIN: CPT | Performed by: INTERNAL MEDICINE

## 2022-07-21 PROCEDURE — 1101F PT FALLS ASSESS-DOCD LE1/YR: CPT | Performed by: INTERNAL MEDICINE

## 2022-07-21 PROCEDURE — G8536 NO DOC ELDER MAL SCRN: HCPCS | Performed by: INTERNAL MEDICINE

## 2022-07-21 PROCEDURE — 3017F COLORECTAL CA SCREEN DOC REV: CPT | Performed by: INTERNAL MEDICINE

## 2022-07-21 PROCEDURE — G8432 DEP SCR NOT DOC, RNG: HCPCS | Performed by: INTERNAL MEDICINE

## 2022-07-21 PROCEDURE — G8417 CALC BMI ABV UP PARAM F/U: HCPCS | Performed by: INTERNAL MEDICINE

## 2022-07-21 PROCEDURE — G8427 DOCREV CUR MEDS BY ELIG CLIN: HCPCS | Performed by: INTERNAL MEDICINE

## 2022-07-21 PROCEDURE — G0463 HOSPITAL OUTPT CLINIC VISIT: HCPCS | Performed by: INTERNAL MEDICINE

## 2022-07-22 ENCOUNTER — TELEPHONE (OUTPATIENT)
Dept: ONCOLOGY | Age: 72
End: 2022-07-22

## 2022-07-22 NOTE — TELEPHONE ENCOUNTER
[address] at [place]  [phone]      07/22/22 9:26 AM Attempted to call patient via mobile number listed. No answer, left message requesting return call. Provided office phone number as well for call back. Would like to update patient that Dr. Sirisha Giraldo discussed rash and shared photo with Dr. Colby Montelongo, who feels that this rash may be just slightly outside of prior treatmentfield. He wants to see him to discuss pros/cons of doing radiation again. Radiation oncology will be calling patient to arrange appointment. 07/25/22 9:52 AM Received return call. Verified patient ID x 2. Advised of above. Patient voiced understanding. No further questions or concerns at this time.

## 2022-12-30 ENCOUNTER — HOSPITAL ENCOUNTER (OUTPATIENT)
Dept: RADIATION THERAPY | Age: 72
Discharge: HOME | End: 2022-12-30

## 2023-01-19 ENCOUNTER — OFFICE VISIT (OUTPATIENT)
Dept: ONCOLOGY | Age: 73
End: 2023-01-19
Payer: MEDICARE

## 2023-01-19 VITALS
HEART RATE: 99 BPM | OXYGEN SATURATION: 95 % | HEIGHT: 66 IN | BODY MASS INDEX: 36.8 KG/M2 | DIASTOLIC BLOOD PRESSURE: 76 MMHG | WEIGHT: 229 LBS | SYSTOLIC BLOOD PRESSURE: 119 MMHG | TEMPERATURE: 97.6 F | RESPIRATION RATE: 18 BRPM

## 2023-01-19 DIAGNOSIS — Z85.46 PERSONAL HISTORY OF PROSTATE CANCER: ICD-10-CM

## 2023-01-19 DIAGNOSIS — Z00.00 HEALTHCARE MAINTENANCE: ICD-10-CM

## 2023-01-19 DIAGNOSIS — C88.4 PRIMARY CUTANEOUS MARGINAL ZONE B-CELL LYMPHOMA (HCC): Primary | ICD-10-CM

## 2023-01-19 PROCEDURE — G8536 NO DOC ELDER MAL SCRN: HCPCS | Performed by: INTERNAL MEDICINE

## 2023-01-19 PROCEDURE — G8427 DOCREV CUR MEDS BY ELIG CLIN: HCPCS | Performed by: INTERNAL MEDICINE

## 2023-01-19 PROCEDURE — 1101F PT FALLS ASSESS-DOCD LE1/YR: CPT | Performed by: INTERNAL MEDICINE

## 2023-01-19 PROCEDURE — 1123F ACP DISCUSS/DSCN MKR DOCD: CPT | Performed by: INTERNAL MEDICINE

## 2023-01-19 PROCEDURE — 3017F COLORECTAL CA SCREEN DOC REV: CPT | Performed by: INTERNAL MEDICINE

## 2023-01-19 PROCEDURE — G8417 CALC BMI ABV UP PARAM F/U: HCPCS | Performed by: INTERNAL MEDICINE

## 2023-01-19 PROCEDURE — 99213 OFFICE O/P EST LOW 20 MIN: CPT | Performed by: INTERNAL MEDICINE

## 2023-01-19 PROCEDURE — G0463 HOSPITAL OUTPT CLINIC VISIT: HCPCS | Performed by: INTERNAL MEDICINE

## 2023-01-19 PROCEDURE — G8510 SCR DEP NEG, NO PLAN REQD: HCPCS | Performed by: INTERNAL MEDICINE

## 2023-01-19 NOTE — PROGRESS NOTES
1. Have you been to the ER, urgent care clinic since your last visit? Hospitalized since your last visit? No    2. Have you seen or consulted any other health care providers outside of the [address] [place] [address] since your last visit? Include any pap smears or colon screening.  Yes Dermatologist         Visit Vitals  /76 (BP 1 Location: Left upper arm, BP Patient Position: Sitting, BP Cuff Size: Large adult)   Pulse 99   Temp 97.6 °F (36.4 °C) (Oral)   Resp 18   Ht 5' 6\" (1.676 m)   Wt 229 lb (103.9 kg)   SpO2 95%   BMI 36.96 kg/m²            Chief Complaint   Patient presents with    Follow-up    Lymphoma

## 2023-04-21 DIAGNOSIS — C88.4 PRIMARY CUTANEOUS MARGINAL ZONE B-CELL LYMPHOMA (HCC): Primary | ICD-10-CM

## 2023-04-22 DIAGNOSIS — C88.4 PRIMARY CUTANEOUS MARGINAL ZONE B-CELL LYMPHOMA (HCC): Primary | ICD-10-CM

## 2023-04-23 DIAGNOSIS — C88.4 PRIMARY CUTANEOUS MARGINAL ZONE B-CELL LYMPHOMA (HCC): Primary | ICD-10-CM

## 2023-06-25 LAB
ALBUMIN SERPL-MCNC: 4.5 G/DL (ref 3.8–4.8)
ALBUMIN/GLOB SERPL: 1.8 {RATIO} (ref 1.2–2.2)
ALP SERPL-CCNC: 91 IU/L (ref 44–121)
ALT SERPL-CCNC: 38 IU/L (ref 0–44)
AST SERPL-CCNC: 31 IU/L (ref 0–40)
BASOPHILS # BLD AUTO: 0 X10E3/UL (ref 0–0.2)
BASOPHILS NFR BLD AUTO: 1 %
BILIRUB SERPL-MCNC: 0.6 MG/DL (ref 0–1.2)
BUN SERPL-MCNC: 16 MG/DL (ref 8–27)
BUN/CREAT SERPL: 18 (ref 10–24)
CALCIUM SERPL-MCNC: 9.5 MG/DL (ref 8.6–10.2)
CHLORIDE SERPL-SCNC: 101 MMOL/L (ref 96–106)
CO2 SERPL-SCNC: 19 MMOL/L (ref 20–29)
CREAT SERPL-MCNC: 0.89 MG/DL (ref 0.76–1.27)
EGFRCR SERPLBLD CKD-EPI 2021: 92 ML/MIN/1.73
EOSINOPHIL # BLD AUTO: 0.3 X10E3/UL (ref 0–0.4)
EOSINOPHIL NFR BLD AUTO: 6 %
ERYTHROCYTE [DISTWIDTH] IN BLOOD BY AUTOMATED COUNT: 13.2 % (ref 11.6–15.4)
GLOBULIN SER CALC-MCNC: 2.5 G/DL (ref 1.5–4.5)
GLUCOSE SERPL-MCNC: 149 MG/DL (ref 70–99)
HCT VFR BLD AUTO: 48 % (ref 37.5–51)
HGB BLD-MCNC: 16 G/DL (ref 13–17.7)
IMM GRANULOCYTES # BLD AUTO: 0 X10E3/UL (ref 0–0.1)
IMM GRANULOCYTES NFR BLD AUTO: 0 %
LDH SERPL L TO P-CCNC: 241 IU/L (ref 121–224)
LYMPHOCYTES # BLD AUTO: 1.2 X10E3/UL (ref 0.7–3.1)
LYMPHOCYTES NFR BLD AUTO: 24 %
MCH RBC QN AUTO: 30 PG (ref 26.6–33)
MCHC RBC AUTO-ENTMCNC: 33.3 G/DL (ref 31.5–35.7)
MCV RBC AUTO: 90 FL (ref 79–97)
MONOCYTES # BLD AUTO: 0.5 X10E3/UL (ref 0.1–0.9)
MONOCYTES NFR BLD AUTO: 10 %
NEUTROPHILS # BLD AUTO: 3 X10E3/UL (ref 1.4–7)
NEUTROPHILS NFR BLD AUTO: 59 %
PLATELET # BLD AUTO: 207 X10E3/UL (ref 150–450)
POTASSIUM SERPL-SCNC: 4.5 MMOL/L (ref 3.5–5.2)
PROT SERPL-MCNC: 7 G/DL (ref 6–8.5)
RBC # BLD AUTO: 5.33 X10E6/UL (ref 4.14–5.8)
SODIUM SERPL-SCNC: 139 MMOL/L (ref 134–144)
WBC # BLD AUTO: 5 X10E3/UL (ref 3.4–10.8)

## 2023-07-20 ENCOUNTER — OFFICE VISIT (OUTPATIENT)
Age: 73
End: 2023-07-20
Payer: MEDICARE

## 2023-07-20 VITALS
RESPIRATION RATE: 18 BRPM | HEIGHT: 66 IN | BODY MASS INDEX: 36.03 KG/M2 | DIASTOLIC BLOOD PRESSURE: 78 MMHG | OXYGEN SATURATION: 97 % | SYSTOLIC BLOOD PRESSURE: 119 MMHG | HEART RATE: 97 BPM | WEIGHT: 224.2 LBS | TEMPERATURE: 97.8 F

## 2023-07-20 DIAGNOSIS — Z00.00 HEALTHCARE MAINTENANCE: ICD-10-CM

## 2023-07-20 DIAGNOSIS — E78.5 HYPERLIPIDEMIA, UNSPECIFIED HYPERLIPIDEMIA TYPE: ICD-10-CM

## 2023-07-20 DIAGNOSIS — C88.4 EXTRANODAL MARGINAL ZONE B-CELL LYMPHOMA OF MUCOSA-ASSOCIATED LYMPHOID TISSUE (MALT-LYMPHOMA) (HCC): Primary | ICD-10-CM

## 2023-07-20 PROCEDURE — 99214 OFFICE O/P EST MOD 30 MIN: CPT | Performed by: INTERNAL MEDICINE

## 2023-07-20 ASSESSMENT — PATIENT HEALTH QUESTIONNAIRE - PHQ9
SUM OF ALL RESPONSES TO PHQ QUESTIONS 1-9: 0
1. LITTLE INTEREST OR PLEASURE IN DOING THINGS: 0
SUM OF ALL RESPONSES TO PHQ9 QUESTIONS 1 & 2: 0
2. FEELING DOWN, DEPRESSED OR HOPELESS: 0
SUM OF ALL RESPONSES TO PHQ QUESTIONS 1-9: 0

## 2023-07-20 NOTE — PROGRESS NOTES
[address] [facility] Oncology at [facility]  [phone]    Hematology / Oncology Established Visit    Reason for Visit:   Stephen Briscoe is a 72 y.o. male who is seen for follow up of cutaneous lymphoma. Initially referred by FABRICIO Pena and Dr. Debbi Flores ([address]    Hematology Oncology Treatment History:     Diagnosis: Primary cutaneous marginal zone lymphoma    Stage: N/A    Pathology:   1/16/20 R lateral mid-back, 5mm shave biopsy: Atypical lymphoid infiltrate  Comment: The histologic features are quite worrisome for a low-grade B cell neoplasm, possibly a marginal zone lymphoma given the presence of quite a few plasma cells wtihin the infiltrate. However, in situ hybridization for kappa and lambda fialed to produce a monoclonal result and PCR for T- and B-cell rearrangements failed to produce a pcr product and clonality could not be demonstrated. The tissue in the block has been completely exhausted and is no longer available for further studies. It would be important that complete removal or additional biopsies of this process be performed such that repeat studies can be attempted in the hopes of making a more definitive diagnosis. 2/20/20 R mid-back lateral lesion and excision right medial mid-back skin lesion: Marginal zone lymphoma  Immunophenotype: BCL2+, CD43+; Tumor size up to 17mm; Grade 1  Flow cytometry: Monoclonal B cell population (18% of cells) without detectable CD5, CD10, CD23 expression c/w B cell lymphoma/leukemia. Differential diagnosis baesd on immunophenotype includes: Marginal zone lymphoma, lymphoplasmacytic lymphoma, [id number] follicular lymphoma, large B cell lymphoma. 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  Kappa in-situ

## 2023-08-10 ENCOUNTER — HOSPITAL ENCOUNTER (OUTPATIENT)
Facility: HOSPITAL | Age: 73
Discharge: HOME | End: 2023-08-10
Attending: SPECIALIST

## 2023-08-10 DIAGNOSIS — Z00.00 ROUTINE GENERAL MEDICAL EXAMINATION AT A HEALTH CARE FACILITY: ICD-10-CM

## 2023-08-10 PROCEDURE — 75571 CT HRT W/O DYE W/CA TEST: CPT

## 2024-01-08 DIAGNOSIS — C88.4 EXTRANODAL MARGINAL ZONE B-CELL LYMPHOMA OF MUCOSA-ASSOCIATED LYMPHOID TISSUE (MALT-LYMPHOMA) (HCC): ICD-10-CM

## 2024-01-09 LAB
ALBUMIN SERPL-MCNC: 4.3 G/DL (ref 3.8–4.8)
ALBUMIN/GLOB SERPL: 1.8 {RATIO} (ref 1.2–2.2)
ALP SERPL-CCNC: 95 IU/L (ref 44–121)
ALT SERPL-CCNC: 31 IU/L (ref 0–44)
AST SERPL-CCNC: 26 IU/L (ref 0–40)
BASOPHILS # BLD AUTO: 0 X10E3/UL (ref 0–0.2)
BASOPHILS NFR BLD AUTO: 1 %
BILIRUB SERPL-MCNC: 0.7 MG/DL (ref 0–1.2)
BUN SERPL-MCNC: 15 MG/DL (ref 8–27)
BUN/CREAT SERPL: 15 (ref 10–24)
CALCIUM SERPL-MCNC: 9.3 MG/DL (ref 8.6–10.2)
CHLORIDE SERPL-SCNC: 102 MMOL/L (ref 96–106)
CO2 SERPL-SCNC: 25 MMOL/L (ref 20–29)
CREAT SERPL-MCNC: 0.98 MG/DL (ref 0.76–1.27)
EGFRCR SERPLBLD CKD-EPI 2021: 82 ML/MIN/1.73
EOSINOPHIL # BLD AUTO: 0.4 X10E3/UL (ref 0–0.4)
EOSINOPHIL NFR BLD AUTO: 7 %
ERYTHROCYTE [DISTWIDTH] IN BLOOD BY AUTOMATED COUNT: 13.7 % (ref 11.6–15.4)
GLOBULIN SER CALC-MCNC: 2.4 G/DL (ref 1.5–4.5)
GLUCOSE SERPL-MCNC: 95 MG/DL (ref 70–99)
HCT VFR BLD AUTO: 48.4 % (ref 37.5–51)
HGB BLD-MCNC: 15.7 G/DL (ref 13–17.7)
IMM GRANULOCYTES # BLD AUTO: 0 X10E3/UL (ref 0–0.1)
IMM GRANULOCYTES NFR BLD AUTO: 0 %
LDH SERPL L TO P-CCNC: 178 IU/L (ref 121–224)
LYMPHOCYTES # BLD AUTO: 1.5 X10E3/UL (ref 0.7–3.1)
LYMPHOCYTES NFR BLD AUTO: 30 %
MCH RBC QN AUTO: 29.6 PG (ref 26.6–33)
MCHC RBC AUTO-ENTMCNC: 32.4 G/DL (ref 31.5–35.7)
MCV RBC AUTO: 91 FL (ref 79–97)
MONOCYTES # BLD AUTO: 0.6 X10E3/UL (ref 0.1–0.9)
MONOCYTES NFR BLD AUTO: 12 %
NEUTROPHILS # BLD AUTO: 2.4 X10E3/UL (ref 1.4–7)
NEUTROPHILS NFR BLD AUTO: 50 %
PLATELET # BLD AUTO: 225 X10E3/UL (ref 150–450)
POTASSIUM SERPL-SCNC: 4.8 MMOL/L (ref 3.5–5.2)
PROT SERPL-MCNC: 6.7 G/DL (ref 6–8.5)
RBC # BLD AUTO: 5.31 X10E6/UL (ref 4.14–5.8)
SODIUM SERPL-SCNC: 141 MMOL/L (ref 134–144)
WBC # BLD AUTO: 4.8 X10E3/UL (ref 3.4–10.8)

## 2024-01-17 NOTE — PROGRESS NOTES
[facility] [place]  Medical Oncology at [place]  [phone]    Hematology / Oncology Established Visit    Reason for Visit:   Jose Cruz Castillo is a 73 y.o. male who is seen for follow up of cutaneous lymphoma. Initially referred by FABRICIO Reyes and Dr. Kirsten Pearson ([facility])    Hematology Oncology Treatment History:     Diagnosis: Primary cutaneous marginal zone lymphoma    Stage: N/A    Pathology:   1/16/20 R lateral mid-back, 5mm shave biopsy: Atypical lymphoid infiltrate  Comment: The histologic features are quite worrisome for a low-grade B cell neoplasm, possibly a marginal zone lymphoma given the presence of quite a few plasma cells wtihin the infiltrate. However, in situ hybridization for kappa and lambda fialed to produce a monoclonal result and PCR for T- and B-cell rearrangements failed to produce a pcr product and clonality could not be demonstrated. The tissue in the block has been completely exhausted and is no longer available for further studies. It would be important that complete removal or additional biopsies of this process be performed such that repeat studies can be attempted in the hopes of making a more definitive diagnosis.     2/20/20 R mid-back lateral lesion and excision right medial mid-back skin lesion: Marginal zone lymphoma  Immunophenotype: BCL2+, CD43+; Tumor size up to 17mm; Grade 1  Flow cytometry: Monoclonal B cell population (18% of cells) without detectable CD5, CD10, CD23 expression c/w B cell lymphoma/leukemia. Differential diagnosis baesd on immunophenotype includes: Marginal zone lymphoma, lymphoplasmacytic lymphoma, [id number] follicular lymphoma, large B cell lymphoma.    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 Kappa in-situ

## 2024-01-18 ENCOUNTER — OFFICE VISIT (OUTPATIENT)
Age: 74
End: 2024-01-18
Payer: MEDICARE

## 2024-01-18 VITALS
DIASTOLIC BLOOD PRESSURE: 74 MMHG | SYSTOLIC BLOOD PRESSURE: 120 MMHG | HEIGHT: 66 IN | WEIGHT: 224.8 LBS | RESPIRATION RATE: 18 BRPM | BODY MASS INDEX: 36.13 KG/M2 | TEMPERATURE: 98.6 F | HEART RATE: 96 BPM | OXYGEN SATURATION: 97 %

## 2024-01-18 DIAGNOSIS — C88.4 EXTRANODAL MARGINAL ZONE B-CELL LYMPHOMA OF MUCOSA-ASSOCIATED LYMPHOID TISSUE (MALT-LYMPHOMA) (HCC): Primary | ICD-10-CM

## 2024-01-18 DIAGNOSIS — Z85.46 PERSONAL HISTORY OF MALIGNANT NEOPLASM OF PROSTATE: ICD-10-CM

## 2024-01-18 DIAGNOSIS — E78.5 HYPERLIPIDEMIA, UNSPECIFIED HYPERLIPIDEMIA TYPE: ICD-10-CM

## 2024-01-18 DIAGNOSIS — Z00.00 HEALTHCARE MAINTENANCE: ICD-10-CM

## 2024-01-18 PROCEDURE — 99214 OFFICE O/P EST MOD 30 MIN: CPT | Performed by: INTERNAL MEDICINE

## 2024-01-18 RX ORDER — ERGOCALCIFEROL 1.25 MG/1
50000 CAPSULE ORAL WEEKLY
COMMUNITY

## 2024-01-18 ASSESSMENT — PATIENT HEALTH QUESTIONNAIRE - PHQ9
1. LITTLE INTEREST OR PLEASURE IN DOING THINGS: 0
SUM OF ALL RESPONSES TO PHQ9 QUESTIONS 1 & 2: 0
2. FEELING DOWN, DEPRESSED OR HOPELESS: 0
SUM OF ALL RESPONSES TO PHQ QUESTIONS 1-9: 0

## 2024-01-18 NOTE — PROGRESS NOTES
Chief Complaint   Patient presents with    Follow-up           Vitals:    01/18/24 0902   BP: 120/74   Pulse: 96   Resp: 18   Temp: 98.6 °F (37 °C)   SpO2: 97%            1. Have you been to the ER, urgent care clinic since your last visit?  Hospitalized since your last visit?  No  2. Have you seen or consulted any other health care providers outside of the [facility] since your last visit?  Include any pap smears or colon screening. Yes PCP  Cardiologist Dr [facility]

## 2024-03-08 ENCOUNTER — TELEPHONE (OUTPATIENT)
Age: 74
End: 2024-03-08

## 2024-03-08 NOTE — TELEPHONE ENCOUNTER
German from [facility] dermatology called on behalf of patient and stated the patient is having surgery on 3/25 to get the lymphoma removed. They would like to know if he can be seen by Dr. Rubio shortly after the surgery.  CB#[phone]

## 2024-04-03 ENCOUNTER — TELEPHONE (OUTPATIENT)
Age: 74
End: 2024-04-03

## 2024-04-03 NOTE — TELEPHONE ENCOUNTER
[facility] [place] at [facility]  [phone]    04/03/24 2:47 PM EDT - Per Ina request. IGM Serum results faxed

## 2024-04-03 NOTE — TELEPHONE ENCOUNTER
04/03/24 3:09 PM Received incoming call from Dr. Spears. She is a pathologist reviewing patient's recent biopsy. She is requesting pathology report from 2/2020 excisional biopsy. Advised I will fax the report to her.

## 2024-04-03 NOTE — PROGRESS NOTES
[facility] [place]  Medical Oncology at [place]  [phone]    Hematology / Oncology Established Visit    Reason for Visit:   Jose Cruz Castillo is a 73 y.o. male who is seen for follow up of cutaneous lymphoma. Initially referred by FABRICIO Reyes and Dr. Kirsten Pearson ([facility])    Hematology Oncology Treatment History:     Diagnosis: Primary cutaneous marginal zone lymphoma    Stage: N/A    Pathology:   1/16/20 R lateral mid-back, 5mm shave biopsy: Atypical lymphoid infiltrate  Comment: The histologic features are quite worrisome for a low-grade B cell neoplasm, possibly a marginal zone lymphoma given the presence of quite a few plasma cells wtihin the infiltrate. However, in situ hybridization for kappa and lambda fialed to produce a monoclonal result and PCR for T- and B-cell rearrangements failed to produce a pcr product and clonality could not be demonstrated. The tissue in the block has been completely exhausted and is no longer available for further studies. It would be important that complete removal or additional biopsies of this process be performed such that repeat studies can be attempted in the hopes of making a more definitive diagnosis.     2/20/20 R mid-back lateral lesion and excision right medial mid-back skin lesion: Marginal zone lymphoma  Immunophenotype: BCL2+, CD43+; Tumor size up to 17mm; Grade 1  Flow cytometry: Monoclonal B cell population (18% of cells) without detectable CD5, CD10, CD23 expression c/w B cell lymphoma/leukemia. Differential diagnosis baesd on immunophenotype includes: Marginal zone lymphoma, lymphoplasmacytic lymphoma, [id number] follicular lymphoma, large B cell lymphoma.    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 Kappa in-situ

## 2024-04-03 NOTE — TELEPHONE ENCOUNTER
Anna with [facility] called to see if there were any results for Serum IGM Levels that could be sent over? If not could a call be placed to her to inform her their aren't any?    CB# [phone]  Fax# [phone]

## 2024-04-08 ENCOUNTER — OFFICE VISIT (OUTPATIENT)
Age: 74
End: 2024-04-08
Payer: MEDICARE

## 2024-04-08 VITALS
DIASTOLIC BLOOD PRESSURE: 78 MMHG | HEIGHT: 66 IN | OXYGEN SATURATION: 96 % | WEIGHT: 224.7 LBS | SYSTOLIC BLOOD PRESSURE: 119 MMHG | RESPIRATION RATE: 18 BRPM | TEMPERATURE: 98.6 F | BODY MASS INDEX: 36.11 KG/M2 | HEART RATE: 80 BPM

## 2024-04-08 DIAGNOSIS — C88.4 EXTRANODAL MARGINAL ZONE B-CELL LYMPHOMA OF MUCOSA-ASSOCIATED LYMPHOID TISSUE (MALT-LYMPHOMA) (HCC): Primary | ICD-10-CM

## 2024-04-08 DIAGNOSIS — R21 RASH AND OTHER NONSPECIFIC SKIN ERUPTION: ICD-10-CM

## 2024-04-08 DIAGNOSIS — Z85.46 PERSONAL HISTORY OF MALIGNANT NEOPLASM OF PROSTATE: ICD-10-CM

## 2024-04-08 PROCEDURE — G8427 DOCREV CUR MEDS BY ELIG CLIN: HCPCS | Performed by: INTERNAL MEDICINE

## 2024-04-08 PROCEDURE — G8417 CALC BMI ABV UP PARAM F/U: HCPCS | Performed by: INTERNAL MEDICINE

## 2024-04-08 PROCEDURE — 99214 OFFICE O/P EST MOD 30 MIN: CPT | Performed by: INTERNAL MEDICINE

## 2024-04-08 PROCEDURE — 1036F TOBACCO NON-USER: CPT | Performed by: INTERNAL MEDICINE

## 2024-04-08 PROCEDURE — 3017F COLORECTAL CA SCREEN DOC REV: CPT | Performed by: INTERNAL MEDICINE

## 2024-04-08 PROCEDURE — G2211 COMPLEX E/M VISIT ADD ON: HCPCS | Performed by: INTERNAL MEDICINE

## 2024-04-08 PROCEDURE — 1123F ACP DISCUSS/DSCN MKR DOCD: CPT | Performed by: INTERNAL MEDICINE

## 2024-04-08 RX ORDER — ROSUVASTATIN CALCIUM 20 MG/1
20 TABLET, COATED ORAL DAILY
COMMUNITY

## 2024-04-08 ASSESSMENT — PATIENT HEALTH QUESTIONNAIRE - PHQ9
2. FEELING DOWN, DEPRESSED OR HOPELESS: NOT AT ALL
SUM OF ALL RESPONSES TO PHQ QUESTIONS 1-9: 0
1. LITTLE INTEREST OR PLEASURE IN DOING THINGS: NOT AT ALL
SUM OF ALL RESPONSES TO PHQ QUESTIONS 1-9: 0
SUM OF ALL RESPONSES TO PHQ9 QUESTIONS 1 & 2: 0

## 2024-04-08 NOTE — PROGRESS NOTES
Chief Complaint   Patient presents with    Follow-up           Vitals:    04/08/24 0914   BP: 119/78   Pulse: 80   Resp: 18   Temp: 98.6 °F (37 °C)   SpO2: 96%            1. Have you been to the ER, urgent care clinic since your last visit?  Hospitalized since your last visit?  No  2. Have you seen or consulted any other health care providers outside of the [facility] since your last visit?  Include any pap smears or colon screening. Yes Dermatologist Dr [facility]

## 2024-04-18 ENCOUNTER — TELEPHONE (OUTPATIENT)
Age: 74
End: 2024-04-18

## 2024-04-18 NOTE — TELEPHONE ENCOUNTER
Kinga Reyes form [facility] called in stating that the pt had a biopsy done recently. She wanted to know if Dr. Rubio would like an additional punch biopsy. Please advise.     CB# [phone]

## 2024-04-19 ENCOUNTER — TELEPHONE (OUTPATIENT)
Age: 74
End: 2024-04-19

## 2024-04-19 NOTE — TELEPHONE ENCOUNTER
04/19/24 4:03 PM   Called Kinga Man and advised no repeat biopsy at this time. Advised Dr Rubio discussed that low grade lymphoma of the skin is managed with radiation once or twice, but if it keeps recurring, it is not recommended to keep doing radiation. Plan is to continue on observation. She verbalized understanding.

## 2024-07-01 DIAGNOSIS — Z85.46 PERSONAL HISTORY OF MALIGNANT NEOPLASM OF PROSTATE: ICD-10-CM

## 2024-10-01 DIAGNOSIS — C88.40 EXTRANODAL MARGINAL ZONE B-CELL LYMPHOMA OF MUCOSA-ASSOCIATED LYMPHOID TISSUE (MALT-LYMPHOMA): ICD-10-CM

## 2024-10-01 LAB
ALBUMIN SERPL-MCNC: 4.4 G/DL (ref 3.8–4.8)
ALP SERPL-CCNC: 76 IU/L (ref 44–121)
ALT SERPL-CCNC: 32 IU/L (ref 0–44)
AST SERPL-CCNC: 30 IU/L (ref 0–40)
BASOPHILS # BLD AUTO: 0 X10E3/UL (ref 0–0.2)
BASOPHILS NFR BLD AUTO: 1 %
BILIRUB SERPL-MCNC: 0.6 MG/DL (ref 0–1.2)
BUN SERPL-MCNC: 18 MG/DL (ref 8–27)
BUN/CREAT SERPL: 18 (ref 10–24)
CALCIUM SERPL-MCNC: 9.4 MG/DL (ref 8.6–10.2)
CHLORIDE SERPL-SCNC: 102 MMOL/L (ref 96–106)
CO2 SERPL-SCNC: 25 MMOL/L (ref 20–29)
CREAT SERPL-MCNC: 0.99 MG/DL (ref 0.76–1.27)
EGFRCR SERPLBLD CKD-EPI 2021: 81 ML/MIN/1.73
EOSINOPHIL # BLD AUTO: 0.3 X10E3/UL (ref 0–0.4)
EOSINOPHIL NFR BLD AUTO: 6 %
ERYTHROCYTE [DISTWIDTH] IN BLOOD BY AUTOMATED COUNT: 13.3 % (ref 11.6–15.4)
GLOBULIN SER CALC-MCNC: 2.2 G/DL (ref 1.5–4.5)
GLUCOSE SERPL-MCNC: 110 MG/DL (ref 70–99)
HCT VFR BLD AUTO: 49.1 % (ref 37.5–51)
HGB BLD-MCNC: 15.7 G/DL (ref 13–17.7)
IMM GRANULOCYTES # BLD AUTO: 0 X10E3/UL (ref 0–0.1)
IMM GRANULOCYTES NFR BLD AUTO: 0 %
LDH SERPL L TO P-CCNC: 178 IU/L (ref 121–224)
LYMPHOCYTES # BLD AUTO: 1.4 X10E3/UL (ref 0.7–3.1)
LYMPHOCYTES NFR BLD AUTO: 28 %
MCH RBC QN AUTO: 30 PG (ref 26.6–33)
MCHC RBC AUTO-ENTMCNC: 32 G/DL (ref 31.5–35.7)
MCV RBC AUTO: 94 FL (ref 79–97)
MONOCYTES # BLD AUTO: 0.6 X10E3/UL (ref 0.1–0.9)
MONOCYTES NFR BLD AUTO: 12 %
NEUTROPHILS # BLD AUTO: 2.7 X10E3/UL (ref 1.4–7)
NEUTROPHILS NFR BLD AUTO: 53 %
PLATELET # BLD AUTO: 210 X10E3/UL (ref 150–450)
POTASSIUM SERPL-SCNC: 4.6 MMOL/L (ref 3.5–5.2)
PROT SERPL-MCNC: 6.6 G/DL (ref 6–8.5)
RBC # BLD AUTO: 5.23 X10E6/UL (ref 4.14–5.8)
SODIUM SERPL-SCNC: 140 MMOL/L (ref 134–144)
WBC # BLD AUTO: 5 X10E3/UL (ref 3.4–10.8)

## 2024-10-07 ENCOUNTER — OFFICE VISIT (OUTPATIENT)
Age: 74
End: 2024-10-07
Payer: MEDICARE

## 2024-10-07 VITALS
HEART RATE: 76 BPM | OXYGEN SATURATION: 95 % | TEMPERATURE: 97.8 F | SYSTOLIC BLOOD PRESSURE: 126 MMHG | RESPIRATION RATE: 16 BRPM | BODY MASS INDEX: 37 KG/M2 | DIASTOLIC BLOOD PRESSURE: 77 MMHG | HEIGHT: 66 IN | WEIGHT: 230.2 LBS

## 2024-10-07 DIAGNOSIS — E78.5 HYPERLIPIDEMIA, UNSPECIFIED HYPERLIPIDEMIA TYPE: ICD-10-CM

## 2024-10-07 DIAGNOSIS — Z98.890 HISTORY OF MELANOMA EXCISION: ICD-10-CM

## 2024-10-07 DIAGNOSIS — Z85.820 HISTORY OF MELANOMA EXCISION: ICD-10-CM

## 2024-10-07 PROCEDURE — 99214 OFFICE O/P EST MOD 30 MIN: CPT | Performed by: INTERNAL MEDICINE

## 2024-10-07 RX ORDER — TRIAMCINOLONE ACETONIDE 55 UG/1
SPRAY, METERED NASAL
COMMUNITY
Start: 2023-03-07 | End: 2024-10-07

## 2024-10-07 RX ORDER — EZETIMIBE 10 MG/1
10 TABLET ORAL DAILY
COMMUNITY

## 2024-10-07 RX ORDER — TRIAMCINOLONE ACETONIDE 55 UG/1
2 SPRAY, METERED NASAL DAILY
COMMUNITY

## 2024-10-07 ASSESSMENT — PATIENT HEALTH QUESTIONNAIRE - PHQ9
2. FEELING DOWN, DEPRESSED OR HOPELESS: NOT AT ALL
SUM OF ALL RESPONSES TO PHQ QUESTIONS 1-9: 0
SUM OF ALL RESPONSES TO PHQ9 QUESTIONS 1 & 2: 0
1. LITTLE INTEREST OR PLEASURE IN DOING THINGS: NOT AT ALL
SUM OF ALL RESPONSES TO PHQ QUESTIONS 1-9: 0

## 2024-10-07 NOTE — PROGRESS NOTES
Chief Complaint   Patient presents with    Follow-up           Vitals:    10/07/24 0938   BP: 126/77   Pulse: 76   Resp: 16   Temp: 97.8 °F (36.6 °C)   SpO2: 95%            1. Have you been to the ER, urgent care clinic since your last visit?  Hospitalized since your last visit?  No  2. Have you seen or consulted any other health care providers outside of the [facility] since your last visit?  Include any pap smears or colon screening. Yes Dermatologist, Cardiologist      
hybridization decorates the plasma cells, while lambda is negative or almost negative.     2/22/24 Right upper back:  A1: Malignant melanoma, 4mm with dermis also showing kappa predominant lymphoplasmacytic infiltrate.   B1: Kappa predominant lymphoplasmacytic infiltrate    Prior Treatment:   Radiation 200cGy x 12 fractions to right back, 4/30/20 - 5/15/20  2.   Radiation superior/lateral back RT external beam, 9/13/22-9/28/22    Current Treatment: Surveillance    History of Present Illness:   Jose Cruz Castillo is a 74 y.o.male with h/o skin cancer is seen for evaluation of cutaneous marginal zone lymphoma. He had a Mohs surgery in 2019 for melanoma. He has been following closely with Dermatology. He underwent shave biopsies of 2 lesions on his R back. Pathology showed a atypical lymphoid infiltrate in one of biopsies. The other biopsy was negative. He states that one of these regions had been present for years, but bothering him more recently with pain and irritation. He had a previous biopsy years ago per patient and based on that history, Dermatology had been treating those lesions as a possible keloid. His chronic medical problems include prostate cancer s/p prostatectomy in 2005, now with yearly PSA checks with PCP. He also has hypercholesterolemia for which he takes statin.   No fevers, chills, sweats, unintentional weight loss, n/v/d. He notes dry skin which cracks at times, but no recurrent or current rashes.   Patient underwent surgical resection of the raised lesions on right lateral back by Dr. Raulito Sigala in 2020. Given finding of marginal zone lymphoma, this was treated with adjuvant radiation. He then had a recurrence of cutaneous lymphoma on right back removed by Dr. Arteaga. He completed radiation in 9/2022 with Dr. Yu.     Interval History:  Pt is seen for follow up of cutaneous marginal zone lymphoma.   Is following with Dermatology and was seen 9/23/24 by FABRICIO Thurston. Skin exam was overall

## 2025-04-17 ENCOUNTER — OFFICE VISIT (OUTPATIENT)
Age: 75
End: 2025-04-17
Payer: MEDICARE

## 2025-04-17 VITALS
RESPIRATION RATE: 16 BRPM | HEIGHT: 66 IN | WEIGHT: 229.8 LBS | SYSTOLIC BLOOD PRESSURE: 126 MMHG | DIASTOLIC BLOOD PRESSURE: 77 MMHG | OXYGEN SATURATION: 96 % | HEART RATE: 81 BPM | BODY MASS INDEX: 36.93 KG/M2 | TEMPERATURE: 97.8 F

## 2025-04-17 DIAGNOSIS — Z98.890 HISTORY OF MELANOMA EXCISION: ICD-10-CM

## 2025-04-17 DIAGNOSIS — Z85.820 HISTORY OF MELANOMA EXCISION: ICD-10-CM

## 2025-04-17 DIAGNOSIS — C84.A8 CUTANEOUS T-CELL LYMPHOMA INVOLVING LYMPH NODES OF MULTIPLE REGIONS (HCC): Primary | ICD-10-CM

## 2025-04-17 DIAGNOSIS — Z85.46 PERSONAL HISTORY OF MALIGNANT NEOPLASM OF PROSTATE: ICD-10-CM

## 2025-04-17 DIAGNOSIS — R21 RASH AND OTHER NONSPECIFIC SKIN ERUPTION: ICD-10-CM

## 2025-04-17 PROCEDURE — 99214 OFFICE O/P EST MOD 30 MIN: CPT | Performed by: NURSE PRACTITIONER

## 2025-04-17 PROCEDURE — 1123F ACP DISCUSS/DSCN MKR DOCD: CPT | Performed by: NURSE PRACTITIONER

## 2025-04-17 PROCEDURE — 1159F MED LIST DOCD IN RCRD: CPT | Performed by: NURSE PRACTITIONER

## 2025-04-17 PROCEDURE — 1036F TOBACCO NON-USER: CPT | Performed by: NURSE PRACTITIONER

## 2025-04-17 PROCEDURE — G8417 CALC BMI ABV UP PARAM F/U: HCPCS | Performed by: NURSE PRACTITIONER

## 2025-04-17 PROCEDURE — 3017F COLORECTAL CA SCREEN DOC REV: CPT | Performed by: NURSE PRACTITIONER

## 2025-04-17 PROCEDURE — 1126F AMNT PAIN NOTED NONE PRSNT: CPT | Performed by: NURSE PRACTITIONER

## 2025-04-17 PROCEDURE — G8427 DOCREV CUR MEDS BY ELIG CLIN: HCPCS | Performed by: NURSE PRACTITIONER

## 2025-04-17 PROCEDURE — 1160F RVW MEDS BY RX/DR IN RCRD: CPT | Performed by: NURSE PRACTITIONER

## 2025-04-17 ASSESSMENT — PATIENT HEALTH QUESTIONNAIRE - PHQ9
1. LITTLE INTEREST OR PLEASURE IN DOING THINGS: NOT AT ALL
SUM OF ALL RESPONSES TO PHQ QUESTIONS 1-9: 0
2. FEELING DOWN, DEPRESSED OR HOPELESS: NOT AT ALL

## 2025-04-17 NOTE — PROGRESS NOTES
[facility] [place]  Medical Oncology at [place]  [phone]    Hematology / Oncology Established Visit    Reason for Visit:   Jose Cruz Castillo is a 74 y.o. male who is seen for follow up of cutaneous lymphoma. Initially referred by FABRICIO Reeys and Dr. Kirsten Pearson ([facility])    Hematology Oncology Treatment History:     Diagnosis: Primary cutaneous marginal zone lymphoma    Stage: N/A    Pathology:   1/16/20 R lateral mid-back, 5mm shave biopsy: Atypical lymphoid infiltrate  Comment: The histologic features are quite worrisome for a low-grade B cell neoplasm, possibly a marginal zone lymphoma given the presence of quite a few plasma cells wtihin the infiltrate. However, in situ hybridization for kappa and lambda fialed to produce a monoclonal result and PCR for T- and B-cell rearrangements failed to produce a pcr product and clonality could not be demonstrated. The tissue in the block has been completely exhausted and is no longer available for further studies. It would be important that complete removal or additional biopsies of this process be performed such that repeat studies can be attempted in the hopes of making a more definitive diagnosis.     2/20/20 R mid-back lateral lesion and excision right medial mid-back skin lesion: Marginal zone lymphoma  Immunophenotype: BCL2+, CD43+; Tumor size up to 17mm; Grade 1  Flow cytometry: Monoclonal B cell population (18% of cells) without detectable CD5, CD10, CD23 expression c/w B cell lymphoma/leukemia. Differential diagnosis baesd on immunophenotype includes: Marginal zone lymphoma, lymphoplasmacytic lymphoma, [id number] follicular lymphoma, large B cell lymphoma.    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 Kappa in-situ

## 2025-04-17 NOTE — PROGRESS NOTES
Jose Cruz Castillo is a 74 y.o. male follow up for         1. Have you been to the ER, urgent care clinic since your last visit?  Hospitalized since your last visit?{no    2. Have you seen or consulted any other health care providers outside of the [facility] since your last visit?  Include any pap smears or colon screening. PCP